# Patient Record
Sex: MALE | Race: WHITE | Employment: OTHER | URBAN - METROPOLITAN AREA
[De-identification: names, ages, dates, MRNs, and addresses within clinical notes are randomized per-mention and may not be internally consistent; named-entity substitution may affect disease eponyms.]

---

## 2017-01-05 NOTE — PROGRESS NOTES
Patient here with . About 2-3 weeks ago had extensive sinus surgery that went reasonably well he was discharged and is part of his discharge medication his wife gave him a promethazine tablet.   Shortly thereafter he began confusion irritability

## 2017-01-10 RX ORDER — PAROXETINE 30 MG/1
TABLET, FILM COATED ORAL
Qty: 90 TABLET | Refills: 0 | Status: SHIPPED | OUTPATIENT
Start: 2017-01-10 | End: 2017-06-13 | Stop reason: DRUGHIGH

## 2017-01-12 ENCOUNTER — TELEPHONE (OUTPATIENT)
Dept: FAMILY MEDICINE CLINIC | Facility: CLINIC | Age: 82
End: 2017-01-12

## 2017-01-12 ENCOUNTER — OFFICE VISIT (OUTPATIENT)
Dept: FAMILY MEDICINE CLINIC | Facility: CLINIC | Age: 82
End: 2017-01-12

## 2017-01-12 DIAGNOSIS — Z02.9 ENCOUNTER FOR ADMINISTRATIVE EXAMINATIONS: Primary | ICD-10-CM

## 2017-01-12 NOTE — PROGRESS NOTES
Patient presents to walk in clinic with wife with complaint today of sinus issues. Upon reviewing history was seen at his PCP office on 1/5/17 after sinus surgery for possible antibiotic reaction and evaluated for sinus problem.  Note states that recheck on

## 2017-01-13 RX ORDER — FINASTERIDE 5 MG/1
TABLET, FILM COATED ORAL
Qty: 90 TABLET | Refills: 0 | Status: SHIPPED | OUTPATIENT
Start: 2017-01-13 | End: 2017-04-16

## 2017-01-13 NOTE — PROGRESS NOTES
Here yet again shortly after he had another office visit. He is accompanied by his wife. He is concerned about ongoing drainage especially in the right estuardo-facial area. He has had several operations for his sinuses some very recently.   Though he feels

## 2017-01-16 ENCOUNTER — MED REC SCAN ONLY (OUTPATIENT)
Dept: FAMILY MEDICINE CLINIC | Facility: CLINIC | Age: 82
End: 2017-01-16

## 2017-01-16 ENCOUNTER — TELEPHONE (OUTPATIENT)
Dept: FAMILY MEDICINE CLINIC | Facility: CLINIC | Age: 82
End: 2017-01-16

## 2017-01-17 ENCOUNTER — TELEPHONE (OUTPATIENT)
Dept: FAMILY MEDICINE CLINIC | Facility: CLINIC | Age: 82
End: 2017-01-17

## 2017-01-18 ENCOUNTER — TELEPHONE (OUTPATIENT)
Dept: FAMILY MEDICINE CLINIC | Facility: CLINIC | Age: 82
End: 2017-01-18

## 2017-02-01 RX ORDER — TAMSULOSIN HYDROCHLORIDE 0.4 MG/1
CAPSULE ORAL
Qty: 90 CAPSULE | Refills: 0 | Status: SHIPPED | OUTPATIENT
Start: 2017-02-01 | End: 2017-09-05

## 2017-02-03 NOTE — PROGRESS NOTES
Here with his wife. His last meeting was met with a great deal of anxiety and I placed him on a lower dose of Klonopin. He seems a bit calmer both to himself and his wife but perhaps has become a bit sleepy.   He still on Namenda and Aricept his wife feel

## 2017-02-16 ENCOUNTER — OFFICE VISIT (OUTPATIENT)
Dept: FAMILY MEDICINE CLINIC | Facility: CLINIC | Age: 82
End: 2017-02-16

## 2017-02-16 VITALS
TEMPERATURE: 99 F | OXYGEN SATURATION: 98 % | HEART RATE: 75 BPM | RESPIRATION RATE: 20 BRPM | DIASTOLIC BLOOD PRESSURE: 80 MMHG | BODY MASS INDEX: 24 KG/M2 | WEIGHT: 151 LBS | SYSTOLIC BLOOD PRESSURE: 130 MMHG

## 2017-02-16 DIAGNOSIS — J30.0 NONALLERGIC VASOMOTOR RHINITIS: Primary | ICD-10-CM

## 2017-02-16 PROCEDURE — 99213 OFFICE O/P EST LOW 20 MIN: CPT | Performed by: NURSE PRACTITIONER

## 2017-02-16 NOTE — PATIENT INSTRUCTIONS
Follow up with ENT as scheduled  Saline nasal spray or rinse  Can use Afrin for 2-3 days  Humidifier in room    Understanding Your Sinuses  Your sinuses are air-filled spaces between the bones in your head.  They have small openings that connect to the na

## 2017-02-16 NOTE — PROGRESS NOTES
CHIEF COMPLAINT:   Patient presents with:  Nasal Congestion: runny nose and nasal congetion on right side  x 2 wks      HPI:   Dioni Mendez is a 80year old male who presents for upper respiratory symptoms for  2 weeks.  Patient reports \"dripping nose\" o Diagnosis Date   • Acute bronchitis    • Personal history of other diseases of circulatory system    • Acute sinusitis, unspecified    • Allergic rhinitis, cause unspecified    • Contact dermatitis and other eczema, due to unspecified cause    • Sciatica LUNGS: denies shortness of breath or wheezing, See HPI  CARDIOVASCULAR: denies chest pain or palpitations   GI: denies N/V/C or abdominal pain  NEURO: Denies headaches    EXAM:   /80 mmHg  Pulse 75  Temp(Src) 98.7 °F (37.1 °C) (Oral)  Resp 20  Wt 151 Your sinuses are air-filled spaces between the bones in your head. They have small openings that connect to the nasal cavity. The sinuses make mucus that drains into the nose.  This helps keep the nose moist and free of dust and germs.            Parts of t

## 2017-03-16 PROBLEM — I25.10 ATHEROSCLEROSIS OF NATIVE CORONARY ARTERY OF NATIVE HEART WITHOUT ANGINA PECTORIS: Status: ACTIVE | Noted: 2017-03-16

## 2017-03-22 ENCOUNTER — NURSE ONLY (OUTPATIENT)
Dept: FAMILY MEDICINE CLINIC | Facility: CLINIC | Age: 82
End: 2017-03-22

## 2017-03-22 VITALS
WEIGHT: 148 LBS | TEMPERATURE: 98 F | HEIGHT: 68 IN | DIASTOLIC BLOOD PRESSURE: 78 MMHG | OXYGEN SATURATION: 99 % | SYSTOLIC BLOOD PRESSURE: 124 MMHG | BODY MASS INDEX: 22.43 KG/M2 | HEART RATE: 67 BPM | RESPIRATION RATE: 20 BRPM

## 2017-03-22 DIAGNOSIS — J00 ACUTE NASOPHARYNGITIS: Primary | ICD-10-CM

## 2017-03-22 PROCEDURE — 99213 OFFICE O/P EST LOW 20 MIN: CPT | Performed by: NURSE PRACTITIONER

## 2017-03-22 NOTE — PATIENT INSTRUCTIONS
-Cool Humidified air in room  -Sleep with head elevated at nightime if coughing    -Push fluids and plenty of rest    -OTC Tylenol/Ibuprofen as packet insert   -Soothing cough drops as packet insert   -Flonase OTC 2 sprays each nostril daily as packet inse

## 2017-03-22 NOTE — PROGRESS NOTES
CHIEF COMPLAINT:   Patient presents with: Body ache and/or chills: s/s for 4-5 days. HPI:   Clearance Lis is a 80year old male who presents for upper respiratory symptoms for  4-5 days. Patient reports chills, body aches, post nasal drip, cough. High blood pressure    • High cholesterol    • Coronary atherosclerosis      bypass surg x4 with 1 stent placed   • Atherosclerosis of coronary artery    • Hyperlipidemia    • Essential hypertension    • Heart disease    • Dementia           Past Surgical nourished,in no apparent distress  SKIN: no rashes,no suspicious lesions  HEAD: atraumatic, normocephalic.  no tenderness on palpation of sinuses  EYES: conjunctiva clear, EOM intact  EARS: TM's clear, no bulging, no retraction, no fluid, bony landmarks vi

## 2017-03-24 ENCOUNTER — APPOINTMENT (OUTPATIENT)
Dept: LAB | Age: 82
End: 2017-03-24
Attending: FAMILY MEDICINE
Payer: MEDICARE

## 2017-03-24 ENCOUNTER — CHARTING TRANS (OUTPATIENT)
Dept: OTHER | Age: 82
End: 2017-03-24

## 2017-03-24 DIAGNOSIS — N13.8 BPH WITH URINARY OBSTRUCTION: ICD-10-CM

## 2017-03-24 DIAGNOSIS — N40.1 BPH WITH URINARY OBSTRUCTION: ICD-10-CM

## 2017-03-24 LAB — PSA SERPL-MCNC: 3.93 NG/ML (ref 0.01–4)

## 2017-03-24 PROCEDURE — 36415 COLL VENOUS BLD VENIPUNCTURE: CPT

## 2017-03-24 PROCEDURE — 84153 ASSAY OF PSA TOTAL: CPT

## 2017-04-08 ENCOUNTER — APPOINTMENT (OUTPATIENT)
Dept: CT IMAGING | Age: 82
End: 2017-04-08
Attending: EMERGENCY MEDICINE
Payer: MEDICARE

## 2017-04-08 ENCOUNTER — APPOINTMENT (OUTPATIENT)
Dept: ULTRASOUND IMAGING | Age: 82
End: 2017-04-08
Attending: EMERGENCY MEDICINE
Payer: MEDICARE

## 2017-04-08 ENCOUNTER — HOSPITAL ENCOUNTER (EMERGENCY)
Age: 82
Discharge: HOME | End: 2017-04-08
Attending: EMERGENCY MEDICINE
Payer: MEDICARE

## 2017-04-08 ENCOUNTER — OFFICE VISIT (OUTPATIENT)
Dept: FAMILY MEDICINE CLINIC | Facility: CLINIC | Age: 82
End: 2017-04-08

## 2017-04-08 VITALS
DIASTOLIC BLOOD PRESSURE: 78 MMHG | BODY MASS INDEX: 22.43 KG/M2 | HEIGHT: 68 IN | SYSTOLIC BLOOD PRESSURE: 130 MMHG | HEART RATE: 74 BPM | TEMPERATURE: 97 F | RESPIRATION RATE: 16 BRPM | WEIGHT: 148 LBS | OXYGEN SATURATION: 99 %

## 2017-04-08 VITALS
OXYGEN SATURATION: 100 % | HEIGHT: 68 IN | DIASTOLIC BLOOD PRESSURE: 77 MMHG | BODY MASS INDEX: 22.73 KG/M2 | SYSTOLIC BLOOD PRESSURE: 155 MMHG | HEART RATE: 61 BPM | RESPIRATION RATE: 16 BRPM | WEIGHT: 150 LBS | TEMPERATURE: 98 F

## 2017-04-08 DIAGNOSIS — Z02.9 ENCOUNTERS FOR ADMINISTRATIVE PURPOSE: Primary | ICD-10-CM

## 2017-04-08 DIAGNOSIS — R10.13 ABDOMINAL PAIN, EPIGASTRIC: Primary | ICD-10-CM

## 2017-04-08 PROCEDURE — 99285 EMERGENCY DEPT VISIT HI MDM: CPT

## 2017-04-08 PROCEDURE — 87086 URINE CULTURE/COLONY COUNT: CPT | Performed by: EMERGENCY MEDICINE

## 2017-04-08 PROCEDURE — 76700 US EXAM ABDOM COMPLETE: CPT

## 2017-04-08 PROCEDURE — 74177 CT ABD & PELVIS W/CONTRAST: CPT

## 2017-04-08 PROCEDURE — 85025 COMPLETE CBC W/AUTO DIFF WBC: CPT | Performed by: EMERGENCY MEDICINE

## 2017-04-08 PROCEDURE — 93005 ELECTROCARDIOGRAM TRACING: CPT

## 2017-04-08 PROCEDURE — 93010 ELECTROCARDIOGRAM REPORT: CPT

## 2017-04-08 PROCEDURE — 84484 ASSAY OF TROPONIN QUANT: CPT | Performed by: EMERGENCY MEDICINE

## 2017-04-08 PROCEDURE — 80053 COMPREHEN METABOLIC PANEL: CPT | Performed by: EMERGENCY MEDICINE

## 2017-04-08 PROCEDURE — 83690 ASSAY OF LIPASE: CPT | Performed by: EMERGENCY MEDICINE

## 2017-04-08 PROCEDURE — 36415 COLL VENOUS BLD VENIPUNCTURE: CPT

## 2017-04-08 PROCEDURE — 81001 URINALYSIS AUTO W/SCOPE: CPT | Performed by: EMERGENCY MEDICINE

## 2017-04-08 NOTE — PROGRESS NOTES
Patient presents with nausea, epigastric abdominal pain, and lethargy/achy x5 days. Denies chest pain, SOB, fever, dizziness/lightheadedness/headache or D/V/C. Called immediate care in [place] [phone] advised ER for more extensive cardiac work up.

## 2017-04-08 NOTE — ED INITIAL ASSESSMENT (HPI)
Mid abd pain for 5 days, feels like stomach flu, nausea no vomiting or diarrhea, is able to eat, denies chest pain or shortness of breath

## 2017-04-08 NOTE — ED PROVIDER NOTES
Patient Seen in: THE [facility] Emergency Department In [place]    History   Patient presents with:  Abdomen/Flank Pain (GI/)    Stated Complaint: nausea, abd pain, sent from [facility]    HPI    30-year-old male who presents here to the emergency department compl Comment Procedure: SEPTORHINOPLASTY WITH OSTEOTOMIES AND  GRAFTS;  Surgeon: Ambrocio Looney;   Location: [facility]    PATIENT DOCUMENTED NOT TO HAVE EXPERIENCED ANY OF THE FOLLOWING EVENTS N/A 3/15/2016    Comment Procedure: SEPTORHINOPLASTY WIT signs reviewed. All other systems reviewed and negative except as noted above. PSFH elements reviewed from today and agreed except as otherwise stated in HPI.     Physical Exam       ED Triage Vitals   BP 04/08/17 1219 132/78 mmHg   Pulse 04/08/17 1 for the following:     WBC Urine 5-10 (*)     RBC URINE 3-5 (*)     Bacteria Urine Rare (*)     Mucous Urine 2+ (*)     All other components within normal limits   COMP METABOLIC PANEL (14) - Abnormal; Notable for the following:     Glucose 113 (*)     BUN (CPT=76700) (Final result) Result time: 04/08/17 14:45:32     Final result by Milka Green MD (04/08/17 14:45:32)     Impression:     CONCLUSION:    1. Hepatic steatosis.   2. Multiple hepatic cysts and small midpole probable parapelvic cyst of left kidney Medication List

## 2017-04-10 ENCOUNTER — OFFICE VISIT (OUTPATIENT)
Dept: FAMILY MEDICINE CLINIC | Facility: CLINIC | Age: 82
End: 2017-04-10

## 2017-04-10 VITALS
DIASTOLIC BLOOD PRESSURE: 61 MMHG | HEART RATE: 61 BPM | BODY MASS INDEX: 22.73 KG/M2 | WEIGHT: 150 LBS | OXYGEN SATURATION: 98 % | RESPIRATION RATE: 20 BRPM | TEMPERATURE: 98 F | SYSTOLIC BLOOD PRESSURE: 112 MMHG | HEIGHT: 68 IN

## 2017-04-10 DIAGNOSIS — R10.0 ACUTE ABDOMINAL PAIN SYNDROME: Primary | ICD-10-CM

## 2017-04-10 PROCEDURE — 99213 OFFICE O/P EST LOW 20 MIN: CPT | Performed by: FAMILY MEDICINE

## 2017-04-10 NOTE — PROGRESS NOTES
Patient is here with wife he was seen in the emergency room this past weekend for midepigastric and supraumbilical pain the pain was midline and it did not change whatsoever.   An ultrasound showed hepatic cysts no other abnormalities perhaps an uncomplicat

## 2017-04-17 ENCOUNTER — TELEPHONE (OUTPATIENT)
Dept: FAMILY MEDICINE CLINIC | Facility: CLINIC | Age: 82
End: 2017-04-17

## 2017-04-17 RX ORDER — FINASTERIDE 5 MG/1
TABLET, FILM COATED ORAL
Qty: 90 TABLET | Refills: 0 | Status: SHIPPED | OUTPATIENT
Start: 2017-04-17 | End: 2017-08-16

## 2017-04-17 NOTE — TELEPHONE ENCOUNTER
Pt wife state pt abdominal pain in not any better, pt now has some nausea. No vomiting or diarrhea. Pain about his belly button. Do you want to do do any additional imaging?   Send to GI?

## 2017-04-19 ENCOUNTER — OFFICE VISIT (OUTPATIENT)
Dept: FAMILY MEDICINE CLINIC | Facility: CLINIC | Age: 82
End: 2017-04-19

## 2017-04-19 ENCOUNTER — HOSPITAL ENCOUNTER (EMERGENCY)
Age: 82
Discharge: HOME | End: 2017-04-19
Attending: EMERGENCY MEDICINE
Payer: MEDICARE

## 2017-04-19 VITALS
HEART RATE: 65 BPM | RESPIRATION RATE: 16 BRPM | OXYGEN SATURATION: 98 % | HEIGHT: 68 IN | BODY MASS INDEX: 22.73 KG/M2 | DIASTOLIC BLOOD PRESSURE: 88 MMHG | WEIGHT: 150 LBS | SYSTOLIC BLOOD PRESSURE: 156 MMHG | TEMPERATURE: 98 F

## 2017-04-19 VITALS
TEMPERATURE: 98 F | OXYGEN SATURATION: 98 % | DIASTOLIC BLOOD PRESSURE: 80 MMHG | BODY MASS INDEX: 22.96 KG/M2 | RESPIRATION RATE: 20 BRPM | HEIGHT: 67.25 IN | SYSTOLIC BLOOD PRESSURE: 134 MMHG | HEART RATE: 69 BPM | WEIGHT: 148 LBS

## 2017-04-19 DIAGNOSIS — Z02.9 ENCOUNTERS FOR ADMINISTRATIVE PURPOSE: Primary | ICD-10-CM

## 2017-04-19 DIAGNOSIS — T17.1XXA NASAL FOREIGN BODY, INITIAL ENCOUNTER: Primary | ICD-10-CM

## 2017-04-19 PROCEDURE — 99282 EMERGENCY DEPT VISIT SF MDM: CPT

## 2017-04-19 PROCEDURE — 99283 EMERGENCY DEPT VISIT LOW MDM: CPT

## 2017-04-19 PROCEDURE — 30300 REMOVE NASAL FOREIGN BODY: CPT

## 2017-04-19 NOTE — ED PROVIDER NOTES
Patient Seen in: [facility] Emergency Department In [place]    History   Patient presents with:  FB in Nose (nasopharyngeal)    Stated Complaint: FB in nose    HPI    61-year-old male presents to the emergency department for foreign body of right nostril. GALLBLADDER         Medications :   FINASTERIDE 5 MG Oral Tab,  TAKE 1 TABLET BY MOUTH EVERY DAY   Donepezil HCl 10 MG Oral Tab,     triamcinolone acetonide 0.1 % External Cream,  Disp as an ointment   Not a creme   TAMSULOSIN HCL 0.4 MG Oral Cap,  TAKE 1 04/19/17 1348 98 %   O2 Device 04/19/17 1348 None (Room air)       Current:/88 mmHg  Pulse 65  Temp(Src) 97.8 °F (36.6 °C) (Temporal)  Resp 16  Ht 172.7 cm (5' 8\")  Wt 68.04 kg  BMI 22.81 kg/m2  SpO2 98%        Physical Exam   Constitutional: He dustin

## 2017-04-19 NOTE — ED NOTES
Up ambulatory, no bleeding noted. Pt states  He can breath well through right nostril. Discharge to home.

## 2017-04-19 NOTE — PROGRESS NOTES
Pt presented with c/o having a sinus cone lodged in rt nare. Pt has sinus surgery 6 weeks ago. Seen by ENT yesterday and given Max-Air sinus cones to try. Pt placed one in rt nare today, he later was sniffling and felt the cone move up in his nare.   Tri

## 2017-04-20 ENCOUNTER — TELEPHONE (OUTPATIENT)
Dept: FAMILY MEDICINE CLINIC | Facility: CLINIC | Age: 82
End: 2017-04-20

## 2017-04-21 ENCOUNTER — OFFICE VISIT (OUTPATIENT)
Dept: FAMILY MEDICINE CLINIC | Facility: CLINIC | Age: 82
End: 2017-04-21

## 2017-04-21 VITALS
RESPIRATION RATE: 16 BRPM | HEIGHT: 67.25 IN | DIASTOLIC BLOOD PRESSURE: 72 MMHG | SYSTOLIC BLOOD PRESSURE: 128 MMHG | BODY MASS INDEX: 23.27 KG/M2 | TEMPERATURE: 98 F | HEART RATE: 60 BPM | WEIGHT: 150 LBS

## 2017-04-21 DIAGNOSIS — J30.0 ACUTE VASOMOTOR RHINITIS: Primary | ICD-10-CM

## 2017-04-21 PROCEDURE — 99213 OFFICE O/P EST LOW 20 MIN: CPT | Performed by: FAMILY MEDICINE

## 2017-04-21 RX ORDER — CLONAZEPAM 0.5 MG/1
0.5 TABLET ORAL NIGHTLY PRN
Qty: 40 TABLET | Refills: 1 | Status: SHIPPED | COMMUNITY
Start: 2017-04-21 | End: 2017-07-20 | Stop reason: ALTCHOICE

## 2017-04-21 RX ORDER — CLONAZEPAM 0.5 MG/1
0.5 TABLET ORAL 2 TIMES DAILY PRN
Qty: 40 TABLET | Refills: 0 | Status: SHIPPED | OUTPATIENT
Start: 2017-04-21 | End: 2017-05-26

## 2017-04-21 NOTE — PROGRESS NOTES
Yesterday seen in the ER for foreign body removal of his right naris. He has no other complaints other than his chronic complaints related to vasomotor rhinitis. Requesting ENT consult which was given to Dr. Suzy Norman.     We briefly talked about his

## 2017-04-25 ENCOUNTER — TELEPHONE (OUTPATIENT)
Dept: FAMILY MEDICINE CLINIC | Facility: CLINIC | Age: 82
End: 2017-04-25

## 2017-04-25 NOTE — TELEPHONE ENCOUNTER
JENNIE  ON FILE    WANTS CLAUDIA TO RX TRAZADONE 50MG FOR PATIENT. WIFE SAID HE RX'D IT FOR HER, AND SHE DIDN'T TAKE IT BUT GAVE IT TO  FOR SLEEP AND IT WORKS. SHE WANTS CLAUDIA TO RX THIS FOR . WILL HE DO THIS?

## 2017-04-25 NOTE — TELEPHONE ENCOUNTER
Pt's wife was given trazadone 3/6/17 #30,  and she gave it to her  to take. States it worked well to help him sleep. Requesting RX for this med. Approve/deny?

## 2017-04-26 PROCEDURE — 87086 URINE CULTURE/COLONY COUNT: CPT | Performed by: UROLOGY

## 2017-05-02 PROCEDURE — 88344 IMHCHEM/IMCYTCHM EA MLT ANTB: CPT | Performed by: UROLOGY

## 2017-05-02 PROCEDURE — 88305 TISSUE EXAM BY PATHOLOGIST: CPT | Performed by: UROLOGY

## 2017-05-26 ENCOUNTER — HOSPITAL ENCOUNTER (OUTPATIENT)
Dept: RADIATION ONCOLOGY | Age: 82
Discharge: HOME | End: 2017-05-26
Attending: RADIOLOGY
Payer: MEDICARE

## 2017-05-26 VITALS
BODY MASS INDEX: 23.97 KG/M2 | DIASTOLIC BLOOD PRESSURE: 92 MMHG | RESPIRATION RATE: 20 BRPM | WEIGHT: 152.69 LBS | TEMPERATURE: 97 F | OXYGEN SATURATION: 98 % | HEIGHT: 67 IN | SYSTOLIC BLOOD PRESSURE: 184 MMHG | HEART RATE: 72 BPM

## 2017-05-26 DIAGNOSIS — C61 PROSTATE CANCER (HCC): Primary | ICD-10-CM

## 2017-05-26 PROCEDURE — 99214 OFFICE O/P EST MOD 30 MIN: CPT

## 2017-05-26 RX ORDER — IPRATROPIUM BROMIDE 21 UG/1
2 SPRAY, METERED NASAL DAILY
Refills: 0 | COMMUNITY
Start: 2017-05-01 | End: 2018-01-11

## 2017-05-26 NOTE — CONSULTS
[facility]    PATIENT'S NAME: DIA, [de-identified] A   RADIATION ONCOLOGIST: Sammy Ford M.D.    PATIENT ACCOUNT #: [de-identified] [facility]   MEDICAL RECORD #: [id number] YOB: 1935   CONSULTATION DATE: 05/26/2017       RADIAT with atherosclerosis, hyperlipidemia, dementia (on Namenda). PAST SURGICAL HISTORY:  Cholecystectomy in 2000, CABG in 2000, prostate biopsy as above, tonsillectomy, nasal surgery, removal of cartilage for the nasal graft, cholecystectomy.     MEDICATIONS + 4.  The PSA of 3.93 is in the low-risk category. In the intermediate category, he has the option of external beam radiation therapy or radical surgery. I recommend external beam radiation therapy.   We also discussed brachytherapy, androgen deprivation Brian Richards M.D.  d: 05/26/2017 16:29:48  t: 05/26/2017 17:16:46  [facility] [id number]  /

## 2017-05-26 NOTE — PROGRESS NOTES
Nursing Consultation Note  Patient: Celia Brunson  YOB: 1935  Age: 80year old  Radiation Oncologist: Dr. Matteo Chiu  Referring Physician: Arletta Hoover, Dr. Rebeca Cheadle  Diagnosis: 97 Elenita Avilau Said Date: 5/26/2017    Histor Surgeon: Huber Dejesus; Location: [facility]    REMV CARTILAGE FOR GRAFT NASAL N/A 3/15/2016    Comment Procedure: SEPTORHINOPLASTY WITH OSTEOTOMIES AND  GRAFTS;  Surgeon: Huber Dejesus;   Location: [facility]    PATIENT WITH PREOPERATIVE ORDER Disp: 90 tablet Rfl: 0   Rosuvastatin Calcium 5 MG Oral Tab TAKE 1 TABLET BY MOUTH NIGHTLY Disp: 90 tablet Rfl: 1   LISINOPRIL 2.5 MG Oral Tab TAKE 1 TABLET(2.5 MG) BY MOUTH EVERY DAY Disp: 90 tablet Rfl: 1   Memantine HCl 10 MG Oral Tab Take 1 tablet (10

## 2017-06-01 ENCOUNTER — HOSPITAL ENCOUNTER (OUTPATIENT)
Dept: RADIATION ONCOLOGY | Age: 82
End: 2017-06-01
Attending: RADIOLOGY
Payer: MEDICARE

## 2017-06-06 ENCOUNTER — HOSPITAL ENCOUNTER (OUTPATIENT)
Dept: RADIATION ONCOLOGY | Age: 82
Discharge: HOME | End: 2017-06-06
Attending: RADIOLOGY
Payer: MEDICARE

## 2017-06-06 PROCEDURE — 77334 RADIATION TREATMENT AID(S): CPT | Performed by: RADIOLOGY

## 2017-06-06 PROCEDURE — 77399 UNLISTED PX MED RADJ PHYSICS: CPT | Performed by: RADIOLOGY

## 2017-06-13 ENCOUNTER — TELEPHONE (OUTPATIENT)
Dept: FAMILY MEDICINE CLINIC | Facility: CLINIC | Age: 82
End: 2017-06-13

## 2017-06-13 RX ORDER — PAROXETINE HYDROCHLORIDE 40 MG/1
40 TABLET, FILM COATED ORAL EVERY MORNING
Qty: 30 TABLET | Refills: 0 | Status: SHIPPED | OUTPATIENT
Start: 2017-06-13 | End: 2017-09-11

## 2017-06-13 RX ORDER — PAROXETINE HYDROCHLORIDE 40 MG/1
TABLET, FILM COATED ORAL
Qty: 90 TABLET | Refills: 0 | OUTPATIENT
Start: 2017-06-13

## 2017-06-13 NOTE — PROGRESS NOTES
Presents with daughter out of concern for this gentleman's mood and mentation.   Over the past several weeks of very improbable story has developed his wife had a very significant encounter with respiratory failure and required nearly 2 weeks worth of venti

## 2017-06-14 PROCEDURE — 77300 RADIATION THERAPY DOSE PLAN: CPT | Performed by: RADIOLOGY

## 2017-06-14 PROCEDURE — 77338 DESIGN MLC DEVICE FOR IMRT: CPT | Performed by: RADIOLOGY

## 2017-06-14 PROCEDURE — 77301 RADIOTHERAPY DOSE PLAN IMRT: CPT | Performed by: RADIOLOGY

## 2017-06-19 ENCOUNTER — MEDICAL CORRESPONDENCE (OUTPATIENT)
Dept: RADIATION ONCOLOGY | Age: 82
End: 2017-06-19

## 2017-06-19 RX ORDER — ROSUVASTATIN CALCIUM 5 MG/1
TABLET, COATED ORAL
Qty: 90 TABLET | Refills: 0 | Status: SHIPPED | OUTPATIENT
Start: 2017-06-19 | End: 2017-12-18

## 2017-06-19 NOTE — PROGRESS NOTES
Dr. Hopper Spearing note appreciated. 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   They will let

## 2017-06-30 ENCOUNTER — TELEPHONE (OUTPATIENT)
Dept: RADIATION ONCOLOGY | Age: 82
End: 2017-06-30

## 2017-06-30 NOTE — TELEPHONE ENCOUNTER
Called and spoke to pt's daughter Shannon Deng regarding pt status, states pt is admitted at [facility] for dementia.  States family still struggling with both parents admitted (mom in rehab); was told by PCP to hold off on RT until pt discharged or unti

## 2017-07-01 ENCOUNTER — HOSPITAL ENCOUNTER (OUTPATIENT)
Dept: RADIATION ONCOLOGY | Age: 82
End: 2017-07-01
Attending: RADIOLOGY
Payer: MEDICARE

## 2017-07-11 ENCOUNTER — TELEPHONE (OUTPATIENT)
Dept: FAMILY MEDICINE CLINIC | Facility: CLINIC | Age: 82
End: 2017-07-11

## 2017-07-11 ENCOUNTER — SOCIAL WORK SERVICES (OUTPATIENT)
Dept: HEMATOLOGY/ONCOLOGY | Facility: HOSPITAL | Age: 82
End: 2017-07-11

## 2017-07-11 NOTE — TELEPHONE ENCOUNTER
[employer] phoned in, they will send pt's current Med list and need orders for JG to review and approve.

## 2017-07-11 NOTE — TELEPHONE ENCOUNTER
Form completed, signed per [employer] [address] and faxed to [phone] with confirmation received. Task completed.

## 2017-07-11 NOTE — TELEPHONE ENCOUNTER
[facility] sent over a form that they state needs to be filled out and faxed back to them today. Patient is moving into the assisted living today.         Form in Triage

## 2017-07-11 NOTE — PROGRESS NOTES
SW received call from patient's son, Philip Hanley. Son inquiring about daily transportation for radiation treatment. Son says patient lives at [facility]. Son states that patient has dementia but is ambulatory.    Patient's wife has been in rehab and has i

## 2017-07-11 NOTE — TELEPHONE ENCOUNTER
Obtained fax, [phone] [employer] reviewed and approved, informed Lesia MOREJON of this approval. Task completed.

## 2017-07-12 ENCOUNTER — TELEPHONE (OUTPATIENT)
Dept: FAMILY MEDICINE CLINIC | Facility: CLINIC | Age: 82
End: 2017-07-12

## 2017-07-13 ENCOUNTER — TELEPHONE (OUTPATIENT)
Dept: FAMILY MEDICINE CLINIC | Facility: CLINIC | Age: 82
End: 2017-07-13

## 2017-07-13 ENCOUNTER — APPOINTMENT (OUTPATIENT)
Dept: RADIATION ONCOLOGY | Age: 82
End: 2017-07-13
Attending: RADIOLOGY
Payer: MEDICARE

## 2017-07-13 ENCOUNTER — TELEPHONE (OUTPATIENT)
Dept: RADIATION ONCOLOGY | Age: 82
End: 2017-07-13

## 2017-07-13 NOTE — TELEPHONE ENCOUNTER
Son in law calling, they would like to make sure that Dr Pearson Burkitt back. Noreen Welch is very irate and they would like something to calm him down.          Please call daughter Thien Tim at [phone]

## 2017-07-13 NOTE — TELEPHONE ENCOUNTER
Received call from pt's daughter, cancelled pt's appointment with Dr. Ginny Simmonds for tomorrow. States family needs to work on pt's transportation situation, will call to re-schedule. Dr. Ginny Simmonds and RTT Isa Barrientos) made aware.

## 2017-07-13 NOTE — TELEPHONE ENCOUNTER
Daughter Aly Kruger calling about patient. he is anxious about his new home and daughter asking for something to calm him down. Call at [phone]    Please advise if you would like to prescribe something, pt has an appt 8/20.

## 2017-07-13 NOTE — TELEPHONE ENCOUNTER
Daughter called and would like a nurse to call back, states that father is having problems with his nerves. Daughter states that she does not feel he needs an appointment, wants to see if a medication can be called in.

## 2017-07-14 ENCOUNTER — TELEPHONE (OUTPATIENT)
Dept: FAMILY MEDICINE CLINIC | Facility: CLINIC | Age: 82
End: 2017-07-14

## 2017-07-14 ENCOUNTER — APPOINTMENT (OUTPATIENT)
Dept: RADIATION ONCOLOGY | Age: 82
End: 2017-07-14
Attending: RADIOLOGY
Payer: MEDICARE

## 2017-07-20 ENCOUNTER — OFFICE VISIT (OUTPATIENT)
Dept: FAMILY MEDICINE CLINIC | Facility: CLINIC | Age: 82
End: 2017-07-20

## 2017-07-20 VITALS
BODY MASS INDEX: 22.43 KG/M2 | WEIGHT: 148 LBS | SYSTOLIC BLOOD PRESSURE: 124 MMHG | HEIGHT: 68 IN | RESPIRATION RATE: 18 BRPM | DIASTOLIC BLOOD PRESSURE: 70 MMHG | TEMPERATURE: 98 F | HEART RATE: 68 BPM | OXYGEN SATURATION: 98 %

## 2017-07-20 DIAGNOSIS — R41.0 DELIRIUM, ACUTE: Primary | ICD-10-CM

## 2017-07-20 PROCEDURE — 99213 OFFICE O/P EST LOW 20 MIN: CPT | Performed by: FAMILY MEDICINE

## 2017-07-20 RX ORDER — CLONAZEPAM 0.5 MG/1
0.5 TABLET, ORALLY DISINTEGRATING ORAL 2 TIMES DAILY PRN
Qty: 60 TABLET | Refills: 3 | Status: SHIPPED | OUTPATIENT
Start: 2017-07-20 | End: 2017-12-04

## 2017-07-20 NOTE — PROGRESS NOTES
A complicated situation. This gentleman has been slowly moving into cognitive impairment and Alzheimer's dementia. This has caused him no small amount of anxiety.   Approximately 6 weeks ago his wife who suffers from COPD entered into full respiratory arr

## 2017-08-04 ENCOUNTER — HOSPITAL ENCOUNTER (OUTPATIENT)
Dept: GENERAL RADIOLOGY | Age: 82
Discharge: HOME | End: 2017-08-04
Attending: FAMILY MEDICINE
Payer: MEDICARE

## 2017-08-04 ENCOUNTER — OFFICE VISIT (OUTPATIENT)
Dept: FAMILY MEDICINE CLINIC | Facility: CLINIC | Age: 82
End: 2017-08-04

## 2017-08-04 VITALS
HEART RATE: 89 BPM | OXYGEN SATURATION: 98 % | DIASTOLIC BLOOD PRESSURE: 70 MMHG | BODY MASS INDEX: 23 KG/M2 | SYSTOLIC BLOOD PRESSURE: 130 MMHG | RESPIRATION RATE: 18 BRPM | TEMPERATURE: 98 F | WEIGHT: 148 LBS

## 2017-08-04 DIAGNOSIS — R10.9 ABDOMINAL PAIN, UNSPECIFIED LOCATION: ICD-10-CM

## 2017-08-04 DIAGNOSIS — R10.84 ABDOMINAL PAIN, ACUTE, GENERALIZED: Primary | ICD-10-CM

## 2017-08-04 PROCEDURE — 74000 XR ABDOMEN (1 VIEW) (CPT=74000): CPT | Performed by: FAMILY MEDICINE

## 2017-08-04 PROCEDURE — 99213 OFFICE O/P EST LOW 20 MIN: CPT | Performed by: FAMILY MEDICINE

## 2017-08-04 NOTE — PROGRESS NOTES
Here with wife and caregiver just released from 3 day hospital stay at the [facility] in [facility] for abdominal pain. Apparently constipation was the final diagnosis.   Next    Ever Root continues to deteriorate as far as cognitive functio

## 2017-08-07 ENCOUNTER — TELEPHONE (OUTPATIENT)
Dept: FAMILY MEDICINE CLINIC | Facility: CLINIC | Age: 82
End: 2017-08-07

## 2017-08-07 NOTE — TELEPHONE ENCOUNTER
Pt wife calling to see which oncologist Pritesh recommends for husbands prostate cancer.  Please advise

## 2017-08-07 NOTE — TELEPHONE ENCOUNTER
Per JAVIER:  Dr. Larson [facility] oncology   [facility] [address], [place], [place]   [phone]    Pt's wife notified.

## 2017-08-12 ENCOUNTER — OFFICE VISIT (OUTPATIENT)
Dept: FAMILY MEDICINE CLINIC | Facility: CLINIC | Age: 82
End: 2017-08-12

## 2017-08-12 VITALS
OXYGEN SATURATION: 99 % | BODY MASS INDEX: 21.98 KG/M2 | RESPIRATION RATE: 16 BRPM | WEIGHT: 145 LBS | HEART RATE: 75 BPM | TEMPERATURE: 98 F | DIASTOLIC BLOOD PRESSURE: 58 MMHG | HEIGHT: 68 IN | SYSTOLIC BLOOD PRESSURE: 100 MMHG

## 2017-08-12 DIAGNOSIS — R10.84 GENERALIZED ABDOMINAL PAIN: Primary | ICD-10-CM

## 2017-08-12 DIAGNOSIS — R19.7 DIARRHEA, UNSPECIFIED TYPE: ICD-10-CM

## 2017-08-12 PROCEDURE — 99213 OFFICE O/P EST LOW 20 MIN: CPT | Performed by: NURSE PRACTITIONER

## 2017-08-12 NOTE — PROGRESS NOTES
CHIEF COMPLAINT:   No chief complaint on file. HPI:   Nancy Anderson is a 80year old male who presents with wife for complaints of abdominal pain. Symptoms have been present for 2 weeks.   Pt was hospitalized at [facility] for abdominal pain Diagnosis Date   • Acute bronchitis    • Acute sinusitis, unspecified    • Allergic rhinitis, cause unspecified    • Atherosclerosis of coronary artery    • Contact dermatitis and other eczema, due to unspecified cause    • Coronary atherosclerosis     b murmur  GI: No masses. BS's present x4. No palpable masses or hepatosplenomegaly.   Abdomen is soft, non-distended, mild tenderness upon palpation in periumbilical region  EXTREMITIES: no cyanosis, clubbing or edema  PSYCH: pleasant mood and affect    ASSE

## 2017-08-12 NOTE — PATIENT INSTRUCTIONS
Abdominal Pain    Abdominal pain is pain in the stomach or belly area. Everyone has this pain from time to time. In many cases it goes away on its own. But abdominal pain can sometimes be due to a serious problem, such as appendicitis.  So it’s important If you have vomiting or diarrhea, sip water or other clear fluids. When you are ready to eat solid foods again, start with small amounts of easy-to-digest, low-fat foods. These include apple sauce, toast, or crackers.    When to seek medical care  Call [phone]

## 2017-08-15 ENCOUNTER — OFFICE VISIT (OUTPATIENT)
Dept: FAMILY MEDICINE CLINIC | Facility: CLINIC | Age: 82
End: 2017-08-15

## 2017-08-15 VITALS
WEIGHT: 145 LBS | TEMPERATURE: 98 F | SYSTOLIC BLOOD PRESSURE: 104 MMHG | RESPIRATION RATE: 16 BRPM | BODY MASS INDEX: 21.48 KG/M2 | DIASTOLIC BLOOD PRESSURE: 72 MMHG | HEART RATE: 72 BPM | HEIGHT: 69 IN

## 2017-08-15 DIAGNOSIS — R10.9 ABDOMINAL PAIN, UNSPECIFIED LOCATION: Primary | ICD-10-CM

## 2017-08-15 LAB
MULTISTIX LOT#: NORMAL NUMERIC
PH, URINE: 7 (ref 4.5–8)
SPECIFIC GRAVITY: 1.01 (ref 1–1.03)
UROBILINOGEN,SEMI-QN: 0.2 MG/DL (ref 0–1.9)

## 2017-08-15 PROCEDURE — 99213 OFFICE O/P EST LOW 20 MIN: CPT | Performed by: FAMILY MEDICINE

## 2017-08-15 PROCEDURE — 81003 URINALYSIS AUTO W/O SCOPE: CPT | Performed by: FAMILY MEDICINE

## 2017-08-15 NOTE — PROGRESS NOTES
Here with wife and caregiver. Was seen in the emergency room for what was severe diarrhea but most likely caused by constipation. He is finally reached a level of pain relief and reestablishment of regular stooling.   Unfortunately he still taking Pepto-B

## 2017-08-16 RX ORDER — FINASTERIDE 5 MG/1
TABLET, FILM COATED ORAL
Qty: 90 TABLET | Refills: 0 | Status: SHIPPED | OUTPATIENT
Start: 2017-08-16 | End: 2017-12-19

## 2017-08-27 ENCOUNTER — OFFICE VISIT (OUTPATIENT)
Dept: FAMILY MEDICINE CLINIC | Facility: CLINIC | Age: 82
End: 2017-08-27

## 2017-08-27 VITALS
RESPIRATION RATE: 20 BRPM | TEMPERATURE: 99 F | HEART RATE: 63 BPM | DIASTOLIC BLOOD PRESSURE: 70 MMHG | WEIGHT: 145 LBS | OXYGEN SATURATION: 99 % | BODY MASS INDEX: 21.98 KG/M2 | HEIGHT: 68 IN | SYSTOLIC BLOOD PRESSURE: 112 MMHG

## 2017-08-27 DIAGNOSIS — J34.89 IRRITATION OF NOSE: Primary | ICD-10-CM

## 2017-08-27 PROCEDURE — 99212 OFFICE O/P EST SF 10 MIN: CPT | Performed by: NURSE PRACTITIONER

## 2017-08-27 RX ORDER — AMOXICILLIN 500 MG/1
CAPSULE ORAL
Refills: 0 | COMMUNITY
Start: 2017-08-22 | End: 2017-10-11

## 2017-08-27 RX ORDER — VANCOMYCIN HYDROCHLORIDE 125 MG/1
125 CAPSULE ORAL
Refills: 0 | COMMUNITY
Start: 2017-08-22 | End: 2017-12-04

## 2017-08-27 NOTE — PATIENT INSTRUCTIONS
· Please blow nose gently using Saline nasal spray to area several times daily. · Use humidifier in the bedroom for comfort at night. · Use a small amount of vaseline or triple antibiotic to the nose with a q tip nightly.   · Follow up with primary care i

## 2017-08-27 NOTE — PROGRESS NOTES
HPI:    Patient ID: Shyla Jacobo is a [de-identified]year old male. Past history of right sided sinus surgery in 2016. Nasal Congestion   This is a new problem. The current episode started today. The problem occurs constantly. The problem has been unchanged. Normocephalic and atraumatic. Right Ear: External ear normal.   Left Ear: External ear normal.   Nose: Mucosal edema and rhinorrhea present. No nose lacerations. No epistaxis. No foreign bodies.    Mouth/Throat: Oropharynx is clear and moist. No orophary

## 2017-09-05 ENCOUNTER — TELEPHONE (OUTPATIENT)
Dept: FAMILY MEDICINE CLINIC | Facility: CLINIC | Age: 82
End: 2017-09-05

## 2017-09-05 NOTE — TELEPHONE ENCOUNTER
[employer] will be sending forms back today, per pts wife we did not fill it out correctly and it needs to be corrected and sent back, please call wife once this is done.

## 2017-09-06 RX ORDER — TAMSULOSIN HYDROCHLORIDE 0.4 MG/1
CAPSULE ORAL
Qty: 90 CAPSULE | Refills: 0 | Status: SHIPPED | OUTPATIENT
Start: 2017-09-06 | End: 2017-12-19

## 2017-09-07 NOTE — TELEPHONE ENCOUNTER
Paperwork returned.   [id number]

## 2017-09-08 ENCOUNTER — TELEPHONE (OUTPATIENT)
Dept: FAMILY MEDICINE CLINIC | Facility: CLINIC | Age: 82
End: 2017-09-08

## 2017-09-08 NOTE — TELEPHONE ENCOUNTER
Harry Faria from [employer] called regarding the forms they received, forms where initially filled out wrong they were sent back for corrections, forms where corrected and refaxed to [employer], however they can not understand the corrections and need clarificat

## 2017-09-11 RX ORDER — PAROXETINE HYDROCHLORIDE 40 MG/1
40 TABLET, FILM COATED ORAL EVERY MORNING
Qty: 90 TABLET | Refills: 0 | Status: SHIPPED | OUTPATIENT
Start: 2017-09-11 | End: 2017-12-19

## 2017-09-11 NOTE — TELEPHONE ENCOUNTER
Refill paroxetine 40mg. [place] on file. He is out of meds and needs this today. Call if any problems.

## 2017-09-11 NOTE — TELEPHONE ENCOUNTER
rx was last refilled on 6/13/17 #30, has pt been off of this?    Attempted to call pt to clarify- busy signal x 2 , retry later

## 2017-09-11 NOTE — TELEPHONE ENCOUNTER
Wife states pt was in a memory unit for some time and a Dr. Morris Shoulder refilled this medication for him on 7/20/17. Approve/deny?  Last ov 8/15/17

## 2017-09-19 ENCOUNTER — OFFICE VISIT (OUTPATIENT)
Dept: FAMILY MEDICINE CLINIC | Facility: CLINIC | Age: 82
End: 2017-09-19

## 2017-09-19 DIAGNOSIS — R10.13 DYSPEPSIA: Primary | ICD-10-CM

## 2017-09-19 NOTE — PROGRESS NOTES
Her for medical questions. Has \"upset stomach\"  Denies pain or constitutional complaints. Her for OTC advice. Pepcid recommended. Instucted to follow with PCM if problem worsens or remain present for 3 days. Understanding verbalized.

## 2017-10-02 ENCOUNTER — OFFICE VISIT (OUTPATIENT)
Dept: FAMILY MEDICINE CLINIC | Facility: CLINIC | Age: 82
End: 2017-10-02

## 2017-10-02 VITALS
DIASTOLIC BLOOD PRESSURE: 66 MMHG | OXYGEN SATURATION: 98 % | TEMPERATURE: 98 F | BODY MASS INDEX: 21 KG/M2 | SYSTOLIC BLOOD PRESSURE: 126 MMHG | WEIGHT: 140 LBS | RESPIRATION RATE: 18 BRPM | HEART RATE: 73 BPM

## 2017-10-02 DIAGNOSIS — A04.72 CLOSTRIDIUM DIFFICILE COLITIS: Primary | ICD-10-CM

## 2017-10-02 PROCEDURE — 99213 OFFICE O/P EST LOW 20 MIN: CPT | Performed by: FAMILY MEDICINE

## 2017-10-02 NOTE — PROGRESS NOTES
Follow up for recent hospitalization for C. difficile enteritis. He is still finishing a 6 week course of vancomycin and tolerating it well with only occasional diarrhea.   The difficulty in his family is he himself is battling slowly progressive dementia

## 2017-10-11 ENCOUNTER — HOSPITAL ENCOUNTER (INPATIENT)
Facility: HOSPITAL | Age: 82
LOS: 2 days | Discharge: HOME HEALTH | DRG: 373 | End: 2017-10-13
Attending: EMERGENCY MEDICINE | Admitting: HOSPITALIST
Payer: MEDICARE

## 2017-10-11 ENCOUNTER — APPOINTMENT (OUTPATIENT)
Dept: CT IMAGING | Facility: HOSPITAL | Age: 82
DRG: 373 | End: 2017-10-11
Attending: HOSPITALIST
Payer: MEDICARE

## 2017-10-11 DIAGNOSIS — E87.6 HYPOKALEMIA: ICD-10-CM

## 2017-10-11 DIAGNOSIS — A04.72 CLOSTRIDIUM DIFFICILE COLITIS: Primary | ICD-10-CM

## 2017-10-11 DIAGNOSIS — R10.9 ABDOMINAL PAIN, ACUTE: ICD-10-CM

## 2017-10-11 PROCEDURE — 99223 1ST HOSP IP/OBS HIGH 75: CPT | Performed by: HOSPITALIST

## 2017-10-11 PROCEDURE — 74177 CT ABD & PELVIS W/CONTRAST: CPT | Performed by: HOSPITALIST

## 2017-10-11 RX ORDER — SODIUM CHLORIDE 9 MG/ML
INJECTION, SOLUTION INTRAVENOUS CONTINUOUS
Status: DISCONTINUED | OUTPATIENT
Start: 2017-10-11 | End: 2017-10-13

## 2017-10-11 RX ORDER — MORPHINE SULFATE 4 MG/ML
4 INJECTION, SOLUTION INTRAMUSCULAR; INTRAVENOUS EVERY 2 HOUR PRN
Status: DISCONTINUED | OUTPATIENT
Start: 2017-10-11 | End: 2017-10-13

## 2017-10-11 RX ORDER — ASPIRIN 81 MG/1
81 TABLET ORAL DAILY
Status: DISCONTINUED | OUTPATIENT
Start: 2017-10-12 | End: 2017-10-13

## 2017-10-11 RX ORDER — SODIUM CHLORIDE 9 MG/ML
INJECTION, SOLUTION INTRAVENOUS CONTINUOUS
Status: ACTIVE | OUTPATIENT
Start: 2017-10-11 | End: 2017-10-11

## 2017-10-11 RX ORDER — METRONIDAZOLE 500 MG/1
500 TABLET ORAL 3 TIMES DAILY
COMMUNITY
End: 2017-12-27

## 2017-10-11 RX ORDER — FINASTERIDE 5 MG/1
5 TABLET, FILM COATED ORAL
Status: DISCONTINUED | OUTPATIENT
Start: 2017-10-12 | End: 2017-10-13

## 2017-10-11 RX ORDER — DONEPEZIL HYDROCHLORIDE 10 MG/1
10 TABLET, FILM COATED ORAL NIGHTLY
Status: DISCONTINUED | OUTPATIENT
Start: 2017-10-11 | End: 2017-10-13

## 2017-10-11 RX ORDER — POTASSIUM CHLORIDE 20 MEQ/1
40 TABLET, EXTENDED RELEASE ORAL ONCE
Status: COMPLETED | OUTPATIENT
Start: 2017-10-11 | End: 2017-10-11

## 2017-10-11 RX ORDER — CARVEDILOL 3.12 MG/1
3.12 TABLET ORAL 2 TIMES DAILY WITH MEALS
Status: DISCONTINUED | OUTPATIENT
Start: 2017-10-11 | End: 2017-10-13

## 2017-10-11 RX ORDER — MEMANTINE HYDROCHLORIDE 10 MG/1
10 TABLET ORAL 2 TIMES DAILY
Status: DISCONTINUED | OUTPATIENT
Start: 2017-10-11 | End: 2017-10-13

## 2017-10-11 RX ORDER — ROSUVASTATIN CALCIUM 10 MG/1
5 TABLET, COATED ORAL NIGHTLY
Status: DISCONTINUED | OUTPATIENT
Start: 2017-10-11 | End: 2017-10-13

## 2017-10-11 RX ORDER — ONDANSETRON 2 MG/ML
4 INJECTION INTRAMUSCULAR; INTRAVENOUS EVERY 6 HOURS PRN
Status: DISCONTINUED | OUTPATIENT
Start: 2017-10-11 | End: 2017-10-13

## 2017-10-11 RX ORDER — METRONIDAZOLE 500 MG/100ML
500 INJECTION, SOLUTION INTRAVENOUS EVERY 8 HOURS
Status: DISCONTINUED | OUTPATIENT
Start: 2017-10-11 | End: 2017-10-13

## 2017-10-11 RX ORDER — ALFUZOSIN HYDROCHLORIDE 10 MG/1
10 TABLET, EXTENDED RELEASE ORAL
Status: DISCONTINUED | OUTPATIENT
Start: 2017-10-12 | End: 2017-10-13

## 2017-10-11 RX ORDER — HEPARIN SODIUM 5000 [USP'U]/ML
5000 INJECTION, SOLUTION INTRAVENOUS; SUBCUTANEOUS EVERY 8 HOURS SCHEDULED
Status: DISCONTINUED | OUTPATIENT
Start: 2017-10-11 | End: 2017-10-13

## 2017-10-11 RX ORDER — LISINOPRIL 2.5 MG/1
2.5 TABLET ORAL DAILY
Status: DISCONTINUED | OUTPATIENT
Start: 2017-10-12 | End: 2017-10-13

## 2017-10-11 RX ORDER — MORPHINE SULFATE 4 MG/ML
2 INJECTION, SOLUTION INTRAMUSCULAR; INTRAVENOUS EVERY 2 HOUR PRN
Status: DISCONTINUED | OUTPATIENT
Start: 2017-10-11 | End: 2017-10-13

## 2017-10-11 RX ORDER — POTASSIUM CHLORIDE 1.5 G/1.77G
20 POWDER, FOR SOLUTION ORAL DAILY
Qty: 4 PACKET | Refills: 0 | Status: SHIPPED | OUTPATIENT
Start: 2017-10-11 | End: 2017-10-13

## 2017-10-11 RX ORDER — MORPHINE SULFATE 4 MG/ML
1 INJECTION, SOLUTION INTRAMUSCULAR; INTRAVENOUS EVERY 2 HOUR PRN
Status: DISCONTINUED | OUTPATIENT
Start: 2017-10-11 | End: 2017-10-13

## 2017-10-11 RX ORDER — ONDANSETRON 2 MG/ML
4 INJECTION INTRAMUSCULAR; INTRAVENOUS EVERY 4 HOURS PRN
Status: DISCONTINUED | OUTPATIENT
Start: 2017-10-11 | End: 2017-10-13

## 2017-10-11 RX ORDER — PAROXETINE HYDROCHLORIDE 20 MG/1
40 TABLET, FILM COATED ORAL EVERY MORNING
Status: DISCONTINUED | OUTPATIENT
Start: 2017-10-12 | End: 2017-10-13

## 2017-10-11 RX ORDER — ACETAMINOPHEN 325 MG/1
650 TABLET ORAL EVERY 6 HOURS PRN
Status: DISCONTINUED | OUTPATIENT
Start: 2017-10-11 | End: 2017-10-13

## 2017-10-11 NOTE — PLAN OF CARE
NURSING ADMISSION NOTE      Patient admitted via Cart  Oriented to room. Safety precautions initiated. Bed in low position. Call light in reach.     Patient admitted to CTU 3, oriented to his room and surroundings  No complaint of pain or discomfort

## 2017-10-11 NOTE — ED INITIAL ASSESSMENT (HPI)
Admitted twice in the last month for abdominal and C Diff just discharged yesterday from [facility] Wife states patient wont eat and lower abdominal pain continues

## 2017-10-11 NOTE — ED PROVIDER NOTES
Patient Seen in: [facility] Emergency Department    History   Patient presents with:  Abdomen/Flank Pain (GI/)    Stated Complaint: Abd pain discharged yesterday from [facility]    HPI    82-year-old male brought in by family for generalized fatigue, abd Mayuri Murphy; Location: [facility]  3/15/2016: PATIENT WITH PREOPERATIVE ORDER FOR IV ANTIBIO* N/A      Comment: Procedure: SEPTORHINOPLASTY WITH OSTEOTOMIES                AND  GRAFTS;  Surgeon: Aly Quan kg   SpO2 98%   BMI 22.05 kg/m²         Physical Exam    General:  Vitals as listed. No acute distress   HEENT: Sclerae anicteric. Conjunctivae show no pallor.   Oropharynx clear, mucous membranes moist   Neck: supple, no rigidity   Lungs: good air exchan currently undergoing antibiotic treatment for C. difficile presents with lower abdominal pain and poor p.o. intake. Patient with hypokalemia, persistent abdominal pain, poor p.o. intake.   Discussed with family who reports that the patient has had nothin

## 2017-10-11 NOTE — H&P
[facility]  History and Physical     Allie Ngo Patient Status:  Emergency    1935 MRN [id number]   Location [address] [facility] Attending Amber Amin MD   Hosp Day # 0 PCP Jessika Whitney DO     Chief Complaint: A Comment: Procedure: SEPTORHINOPLASTY WITH OSTEOTOMIES                AND  GRAFTS;  Surgeon: Bandar Moura;                  Location: [facility]  3/15/2016: RECONSTR NOSE+SANDRA SEPTAL REPAIR N/A      Comment: Procedure: SEPTORHINOPLASTY WITH OSTEOTO Tab TAKE 1 TABLET(2.5 MG) BY MOUTH EVERY DAY Disp: 90 tablet Rfl: 2   Ipratropium Bromide 0.03 % Nasal Solution  Disp:  Rfl: 0   Donepezil HCl 10 MG Oral Tab  Disp:  Rfl: 2   Memantine HCl 10 MG Oral Tab Take 1 tablet (10 mg total) by mouth 2 (two) times d colitis  1. Admit  2. Clear liquid diet  3. Vanco 125 q 6  4. Flagyl > IV if causing issues with appetite  5. Isolation  6. Await CT  7. GI consult   2. Leukocytosis  1. Monitor  3. Transaminitis  1. Await CT  4. Hypokalemia  1. Replete  2. Check mag  5.  C

## 2017-10-12 PROCEDURE — 99497 ADVNCD CARE PLAN 30 MIN: CPT | Performed by: NURSE PRACTITIONER

## 2017-10-12 PROCEDURE — 99233 SBSQ HOSP IP/OBS HIGH 50: CPT | Performed by: HOSPITALIST

## 2017-10-12 RX ORDER — POTASSIUM CHLORIDE 20 MEQ/1
40 TABLET, EXTENDED RELEASE ORAL EVERY 4 HOURS
Status: COMPLETED | OUTPATIENT
Start: 2017-10-12 | End: 2017-10-12

## 2017-10-12 RX ORDER — POTASSIUM CHLORIDE 20 MEQ/1
40 TABLET, EXTENDED RELEASE ORAL EVERY 4 HOURS
Status: COMPLETED | OUTPATIENT
Start: 2017-10-12 | End: 2017-10-13

## 2017-10-12 NOTE — PHYSICAL THERAPY NOTE
PHYSICAL THERAPY QUICK EVALUATION - INPATIENT    Room Number: 2439/4106-R  Evaluation Date: 10/12/2017  Presenting Problem: Clostridium Difficile colitis  Physician Order: PT Eval and Treat    Problem List  Principal Problem:    Clostridium difficile col OSTEOTOMIES                AND  GRAFTS;  Surgeon: Sylvester Vee;                  Location: [facility]  No date: SINUS SURGERY    No date: TONSILLECTOMY    HOME SITUATION  Type of Home: House   Home Layout: One level  Stairs to Enter : 0     Stair 150'  Assistive Device: None  Pattern: Comment (decreased arm swing B, short step length)  Stoop/Curb Assistance: Not tested       Skilled Therapy Provided: Pt demonstrated supine with head of bed raised to sitting at EOB independently.  Pt ambulated 150' w Recommendations: Home    PLAN  Patient has been evaluated and presents with no skilled Physical Therapy needs at this time. Patient discharged from Physical Therapy services. Please re-order if a new functional limitation presents during this admission.

## 2017-10-12 NOTE — CONSULTS
Issa  [id number]  Hospital Day #1  Date of Consult: 10/12/17       Reason for Consultation: Consult requested for evaluation of palliative care needs and goals of care discussion.     Hi SEPTORHINOPLASTY WITH OSTEOTOMIES                AND  GRAFTS;  Surgeon: Sarah Perkins; Location: [facility]  No date: SINUS SURGERY    No date: TONSILLECTOMY    Social History: , lives at home with spouse.     Allergies: No continue to follow.     SHANNON Sanchez  Palliative Care NP  Pager [phone], Phone [phone]  10/12/2017  5:45 PM

## 2017-10-12 NOTE — PALLIATIVE CARE NOTE
Discussed pending palliative care consult with Dr. Blaise Wilson. Message left for pt's spouse to arrange a time for goals of care discussion. Full note to follow.

## 2017-10-12 NOTE — OCCUPATIONAL THERAPY NOTE
OCCUPATIONAL THERAPY QUICK EVALUATION - INPATIENT    Room Number: 9141/9114-Y  Evaluation Date: 10/12/2017     Type of Evaluation: Quick Eval  Presenting Problem: C-Difficile    Physician Order: IP Consult to Occupational Therapy  Reason for Therapy:  ADL/ SEPTORHINOPLASTY WITH OSTEOTOMIES                AND  GRAFTS;  Surgeon: Becca Mendoza;                  Location: [facility]  3/15/2016: PATIENT WITH PREOPERATIVE ORDER FOR IV ANTIBIO* N/A      Comment: Procedure: SEPTORHINOPLASTY WITH OSTEOTOMIES Opposition  Coordination - Finger to Nose: Symmetrical  Coordination - Rapid Alternating Movement: Symmetrical  Coordination - Finger Opposition: Symmetrical       ACTIVITIES OF DAILY LIVING ASSESSMENT  AM-PAC ‘6-Clicks’ Inpatient Daily Activity Short Form review of history including review of medical or therapy record   Specific performance deficits impacting engagement in ADL/IADL  MODERATE  3 - 5 performance deficits   Client Assessment/Performance Deficits  MODERATE - Comorbidities and min to mod modific

## 2017-10-12 NOTE — CONSULTS
[facility]                       Gastroenterology [address] Gastroenterology    Debi Rowe Patient Status:  Inpatient    1935 MRN [id number]   [facility] 3NE-A Attending Adrian Lopez MD   [facility] Day # 1 PCP Karina Pabon Sciatica      PSHx: Past Surgical History:  3/1/00: CABG  12/1/00: CHOLECYSTECTOMY  No date: OTHER SURGICAL HISTORY      Comment: prostate surgery  3/15/2016: PATIENT DOCUMENTED NOT TO HAVE EXPERIENCED ANY* N/A      Comment: Procedure: SEPTORHINOPLASTY WIT acetaminophen (TYLENOL) tab 650 mg 650 mg Oral Q6H PRN   ondansetron HCl (ZOFRAN) injection 4 mg 4 mg Intravenous Q6H PRN   carvedilol (COREG) tab 3.125 mg 3.125 mg Oral BID with meals   aspirin EC tab 81 mg 81 mg Oral Daily   Donepezil HCl (ARICEPT) tab ENT: The patient reports no hoarseness of voice, hearing loss, sinus congestion, tinnitus           Neurologic: + dementia   PE: /87 (BP Location: Right arm)   Pulse 73   Temp 98.6 °F (37 °C) (Oral)   Resp 18   Ht 5' 8\" (1.727 m)   Wt 145 ONLY)(CPT=74177     COMPARISON:  None. INDICATIONS:  Abdominal pain, history of C. difficile.      TECHNIQUE:  CT scanning was performed from the dome of the diaphragm to the pubic symphysis with non-ionic intravenous contrast material. Post contrast co venous gas, or free air. Other nonacute findings as above.      Dictated by: Nati Monsalve MD on 10/11/2017 at 21:11       Approved by: Nati Monsalve MD    Impression: [de-identified] yr-old male with a hx of CAD s/p CABG s/p PCI, dementia, prostate cancer, and recu standpoint.   Nanette Stone MD

## 2017-10-12 NOTE — PROGRESS NOTES
[facility]  Progress Note     Beata Etienne Patient Status:  Inpatient    1935 MRN [id number]   [facility] 3NE-A Attending Luciana Epley, MD   [facility] Day # 1 PCP Rachel Tejeda DO     Chief Complaint: Colitis    S: Patient sta Alfuzosin HCl ER  10 mg Oral Daily with breakfast   • Rosuvastatin Calcium  5 mg Oral Nightly   • PARoxetine HCl  40 mg Oral QAM   • vancomycin  125 mg Oral QID       ASSESSMENT / PLAN:     1. Pancolitis  d/t Cdiff colitis   2. Leukocytosis, resolved  3.  Melina Ring

## 2017-10-13 VITALS
HEIGHT: 68 IN | TEMPERATURE: 98 F | OXYGEN SATURATION: 99 % | RESPIRATION RATE: 18 BRPM | SYSTOLIC BLOOD PRESSURE: 156 MMHG | DIASTOLIC BLOOD PRESSURE: 79 MMHG | HEART RATE: 78 BPM | WEIGHT: 145 LBS | BODY MASS INDEX: 21.98 KG/M2

## 2017-10-13 PROCEDURE — 99239 HOSP IP/OBS DSCHRG MGMT >30: CPT | Performed by: HOSPITALIST

## 2017-10-13 RX ORDER — POTASSIUM CHLORIDE 14.9 MG/ML
20 INJECTION INTRAVENOUS ONCE
Status: COMPLETED | OUTPATIENT
Start: 2017-10-13 | End: 2017-10-13

## 2017-10-13 NOTE — PROGRESS NOTES
[facility]  Progress Note     Ankit Whitingo Patient Status:  Inpatient    1935 MRN [id number]   [facility] 3NE-A Attending Anitra Oconnor MD   [address] Day # 2 PCP Edd Joel DO     Chief Complaint: Colitis    S: Patient doi Oral Daily   • Memantine HCl  10 mg Oral BID   • lisinopril  2.5 mg Oral Daily   • metRONIDAZOLE  500 mg Intravenous Q8H   • Alfuzosin HCl ER  10 mg Oral Daily with breakfast   • Rosuvastatin Calcium  5 mg Oral Nightly   • PARoxetine HCl  40 mg Oral QAM

## 2017-10-13 NOTE — HOME CARE LIAISON
MET WITH PTNT TO DISCUSS HOME HEALTH SERVICES AND COVERAGE CRITERIA. PTNT AGREEABLE TO Darron Nunez. PTNT GIVEN RESIDENTIAL BROCHURE. RESIDENTIAL WITH PROVIDE SN/PT ON DISCHARGE.     Thank you for this referral,   Anila Yates

## 2017-10-13 NOTE — CM/SW NOTE
YVNONE informed pt will possibly d/c today. YVONNE informed [facility] liaison, order entered for home health RN.

## 2017-10-13 NOTE — PLAN OF CARE
PT GETTING IRRITABLE AND WANTING TO LEAVE. DIET ADV TO LOW FIBER. Amanda 73 AFTER LUNCH. WIFE AT BEDSIDE.  CALL OUT TO GI TO FIND OUT TIME WHEN DR WILL BE ON UNIT PER WIFE REQUEST

## 2017-10-13 NOTE — PALLIATIVE CARE NOTE
PCSW met with pt and provided copy of POLST form to pt. Pt and PCSW discussed Palliative Care at d/c. Pt agreeable to this plan and states he really thinks he could benefit from Palliative Care at home.  PCSW confirmed with [facility]riam that their P

## 2017-10-13 NOTE — PLAN OF CARE
GASTROINTESTINAL - ADULT    • Minimal or absence of nausea and vomiting Not Progressing    • Maintains or returns to baseline bowel function Not Progressing        METABOLIC/FLUID AND ELECTROLYTES - ADULT    • Electrolytes maintained within normal limits N

## 2017-10-13 NOTE — PLAN OF CARE
Out in vazquez, yelling & swearing that he wants to go.  DR Quincy Hernandeze aware & trying to resolve situation

## 2017-10-14 NOTE — DISCHARGE SUMMARY
[facility]  DISCHARGE SUMMARY     Jettie Stain Patient Status:  Inpatient    1935 MRN [id number]   [facility] 3NE-A Attending No att. providers found   Hosp Day # 2 PCP Anita Glynn DO     Date of Admission: 10/11/2017  Matt with electrolyte abnormalities. Patient underwent CT a/p which revealed pancolitis d/t Cdiff. Patent was admitted, started on IVF, treatment for Ciff with GI on consult. Patient with clinical improvement. Diet initiated and tolerated.  He was seen by caron 0.4 MG Caps  Commonly known as:  FLOMAX      TAKE 1 CAPSULE BY MOUTH EVERY DAY   Quantity:  90 capsule  Refills:  0     Vancomycin HCl 125 MG Caps  Commonly known as:  VANCOCIN  Notes to patient:  Continue same med. 125 mg.    Refills:  0            Fo

## 2017-10-16 ENCOUNTER — PATIENT OUTREACH (OUTPATIENT)
Dept: CASE MANAGEMENT | Age: 82
End: 2017-10-16

## 2017-10-16 ENCOUNTER — TELEPHONE (OUTPATIENT)
Dept: FAMILY MEDICINE CLINIC | Facility: CLINIC | Age: 82
End: 2017-10-16

## 2017-10-16 NOTE — TELEPHONE ENCOUNTER
Looking for verbal that patient will be managed by [facility] after hospital release. PT and Pallative care  Paramjit Granger needs ASAP as not to hold up patients paperwork.

## 2017-10-18 ENCOUNTER — TELEPHONE (OUTPATIENT)
Dept: INTERNAL MEDICINE CLINIC | Facility: CLINIC | Age: 82
End: 2017-10-18

## 2017-10-18 NOTE — PROGRESS NOTES
10/18/17 MICHAEL spoke with patient's wife Allen Beard, she states that Dheeraj Lynn was admitted to [facility] on 10/16/17. MICHAEL sent message to MD's office. Closed TCM episode.

## 2017-10-18 NOTE — TELEPHONE ENCOUNTER
MICHAEL GUILLEN spoke with patient's wife Rayray Duran who reports that Cristy Morelos was admitted to [facility] on 10/16/17.

## 2017-11-02 ENCOUNTER — TELEPHONE (OUTPATIENT)
Dept: FAMILY MEDICINE CLINIC | Facility: CLINIC | Age: 82
End: 2017-11-02

## 2017-11-02 NOTE — TELEPHONE ENCOUNTER
Long term care papework received - previously completed by CLAUDIA but needs some updating.   See request - triage bin

## 2017-11-03 ENCOUNTER — TELEPHONE (OUTPATIENT)
Dept: FAMILY MEDICINE CLINIC | Facility: CLINIC | Age: 82
End: 2017-11-03

## 2017-11-07 NOTE — TELEPHONE ENCOUNTER
Form revised to read \"recieving 4-10 hours of care per day\" signed by Mabelene Angelucci. Faxed back to [place]. Sent to scan, with copy in triage accordian.

## 2017-11-30 ENCOUNTER — TELEPHONE (OUTPATIENT)
Dept: FAMILY MEDICINE CLINIC | Facility: CLINIC | Age: 82
End: 2017-11-30

## 2017-11-30 NOTE — TELEPHONE ENCOUNTER
Pt d/c'd from hospital on Tuesday.   Calling for [facility] orders, would like to start tomorrow

## 2017-12-04 ENCOUNTER — OFFICE VISIT (OUTPATIENT)
Dept: FAMILY MEDICINE CLINIC | Facility: CLINIC | Age: 82
End: 2017-12-04

## 2017-12-04 VITALS
HEIGHT: 69 IN | TEMPERATURE: 98 F | DIASTOLIC BLOOD PRESSURE: 78 MMHG | HEART RATE: 72 BPM | OXYGEN SATURATION: 99 % | SYSTOLIC BLOOD PRESSURE: 110 MMHG | RESPIRATION RATE: 16 BRPM | BODY MASS INDEX: 20.73 KG/M2 | WEIGHT: 140 LBS

## 2017-12-04 DIAGNOSIS — G30.8 ALZHEIMER'S DISEASE OF OTHER ONSET WITHOUT BEHAVIORAL DISTURBANCE: ICD-10-CM

## 2017-12-04 DIAGNOSIS — F02.80 ALZHEIMER'S DISEASE OF OTHER ONSET WITHOUT BEHAVIORAL DISTURBANCE: ICD-10-CM

## 2017-12-04 DIAGNOSIS — A04.72 C. DIFFICILE COLITIS: Primary | ICD-10-CM

## 2017-12-04 DIAGNOSIS — Z00.00 ROUTINE GENERAL MEDICAL EXAMINATION AT A HEALTH CARE FACILITY: ICD-10-CM

## 2017-12-04 PROCEDURE — G0439 PPPS, SUBSEQ VISIT: HCPCS | Performed by: FAMILY MEDICINE

## 2017-12-04 PROCEDURE — 99213 OFFICE O/P EST LOW 20 MIN: CPT | Performed by: FAMILY MEDICINE

## 2017-12-04 RX ORDER — VANCOMYCIN HYDROCHLORIDE 250 MG/1
CAPSULE ORAL
Refills: 0 | COMMUNITY
Start: 2017-10-09 | End: 2017-12-27

## 2017-12-04 NOTE — PROGRESS NOTES
Here with loving wife. Just recuperating from Clostridium difficile enteritis. He is still finishing up for the next several weeks his vancomycin in a tapering dose he is symptom-free. We discussed his issues with ongoing dementia.   His wife insists t

## 2017-12-05 NOTE — PROGRESS NOTES
Patient is here for Medicare annual wellness visit, accompanied by wife. Please refer to the template that was obliged. Tuning fork exam was unremarkable. He does see an eye doctor on a regular basis.   He is up-to-date on immunizations including this

## 2017-12-18 RX ORDER — ROSUVASTATIN CALCIUM 5 MG/1
TABLET, COATED ORAL
Qty: 90 TABLET | Refills: 0 | Status: SHIPPED | OUTPATIENT
Start: 2017-12-18 | End: 2017-12-27

## 2017-12-18 NOTE — PROGRESS NOTES
Chris Paul is a [de-identified]year old male who presents for a Medicare Annual Wellness visit.     Patient Care Team: Patient Care Team:  Selma Bautista DO as PCP - [facility])  Paula Alejo MD as Consulting Physician ([address] [place] [address])  Jaylin Cardenas Alteration of awareness     Urinary retention     Hypokalemia     Coronary artery disease involving native heart without angina pectoris     Dyslipidemia     Dementia without behavioral disturbance     Atherosclerosis of native coronary artery of native he CHOLEST 171 12/02/2013       Lab Results  Component Value Date   HDL 63 02/28/2016   HDL 66 12/14/2015   HDL 60 12/02/2013       Lab Results  Component Value Date   TRIG 43 02/28/2016   TRIG 65 12/14/2015   TRIG 60 12/02/2013       Lab Results  Component your medications?: Yes    Hearing Problems?: Yes     Functional Status     Hearing Problems?: Yes    Vision Problems? : Yes    Difficulty walking?: No    Difficulty dressing or bathing?: No    Problems with daily activities? : No    Memory Problems?: Yes Colonoscopy Screen every 10 years There are no preventive care reminders to display for this patient. Update Health Maintenance if applicable    Flex Sigmoidoscopy Screen every 5 years No results found for this or any previous visit.  No flowsheet data foun 11/01/2016 5.1    No flowsheet data found.     Creat/alb ratio  Annually      LDL  Annually LDL-CHOLESTEROL (mg/dL (calc))   Date Value   10/19/2011 116     LDL CHOLESTROL (mg/dL)   Date Value   12/02/2013 99     LDL Cholesterol (mg/dL)   Date Value   02/ eczema, due to unspecified cause    • Coronary atherosclerosis     bypass surg x4 with 1 stent placed   • Dementia    • Dermatophytosis of the body    • Enthesopathy of unspecified site    • Essential hypertension    • Heart disease    • High blood pressur Alcohol use: Yes           4.2 oz/week     Glasses of wine: 7 per week     Comment: 1-2 glasses of wine/day    Occ: Retired      EXAM:   /78   Pulse 72   Temp 98.3 °F (36.8 °C) (Temporal)   Resp 16   Ht 69\"   Wt 140 lb   SpO2 99%   BMI 20.67 kg/m²

## 2017-12-19 DIAGNOSIS — F02.80 ALZHEIMER'S DISEASE OF OTHER ONSET WITHOUT BEHAVIORAL DISTURBANCE: ICD-10-CM

## 2017-12-19 DIAGNOSIS — F02.80 LATE ONSET ALZHEIMER'S DISEASE WITHOUT BEHAVIORAL DISTURBANCE (HCC): ICD-10-CM

## 2017-12-19 DIAGNOSIS — G30.1 LATE ONSET ALZHEIMER'S DISEASE WITHOUT BEHAVIORAL DISTURBANCE (HCC): ICD-10-CM

## 2017-12-19 DIAGNOSIS — E78.00 PURE HYPERCHOLESTEROLEMIA: ICD-10-CM

## 2017-12-19 DIAGNOSIS — G30.8 ALZHEIMER'S DISEASE OF OTHER ONSET WITHOUT BEHAVIORAL DISTURBANCE: ICD-10-CM

## 2017-12-19 DIAGNOSIS — I42.0 DCM (DILATED CARDIOMYOPATHY) (HCC): ICD-10-CM

## 2017-12-19 RX ORDER — FINASTERIDE 5 MG/1
TABLET, FILM COATED ORAL
Qty: 90 TABLET | Refills: 0 | Status: SHIPPED | OUTPATIENT
Start: 2017-12-19 | End: 2017-12-27

## 2017-12-19 RX ORDER — PAROXETINE HYDROCHLORIDE 40 MG/1
TABLET, FILM COATED ORAL
Qty: 90 TABLET | Refills: 0 | Status: SHIPPED | OUTPATIENT
Start: 2017-12-19 | End: 2017-12-27

## 2017-12-19 RX ORDER — TAMSULOSIN HYDROCHLORIDE 0.4 MG/1
CAPSULE ORAL
Qty: 90 CAPSULE | Refills: 0 | Status: SHIPPED | OUTPATIENT
Start: 2017-12-19 | End: 2017-12-27

## 2017-12-19 NOTE — TELEPHONE ENCOUNTER
Pt has not been seen in over 1 year.     Medication: DONEPEZIL HCL 10 MG Oral Tab and MEMANTINE HCL 10 MG Oral Tab    Date of last refill: 5/3/2016  [facility]    Last office visit: Visit date not found  Due back to clin

## 2017-12-20 RX ORDER — DONEPEZIL HYDROCHLORIDE 10 MG/1
TABLET, FILM COATED ORAL
Qty: 180 TABLET | Refills: 0 | Status: SHIPPED | OUTPATIENT
Start: 2017-12-20 | End: 2017-12-27

## 2017-12-20 RX ORDER — MEMANTINE HYDROCHLORIDE 10 MG/1
TABLET ORAL
Qty: 180 TABLET | Refills: 0 | Status: SHIPPED | OUTPATIENT
Start: 2017-12-20 | End: 2017-12-27

## 2017-12-20 NOTE — TELEPHONE ENCOUNTER
Spoke with patient's wife (OK per HIPAA) and relayed need for follow up appt. She agreeable to scheduling a f/u appt. Accepted appt on 1/11/18 at 10am.  Appt placed on hold. [facility]     Rx pended to provide medication up to ap

## 2017-12-27 ENCOUNTER — APPOINTMENT (OUTPATIENT)
Dept: CT IMAGING | Facility: HOSPITAL | Age: 82
DRG: 373 | End: 2017-12-27
Attending: EMERGENCY MEDICINE
Payer: MEDICARE

## 2017-12-27 ENCOUNTER — HOSPITAL ENCOUNTER (INPATIENT)
Facility: HOSPITAL | Age: 82
LOS: 2 days | Discharge: HOME | DRG: 373 | End: 2017-12-30
Attending: EMERGENCY MEDICINE | Admitting: STUDENT IN AN ORGANIZED HEALTH CARE EDUCATION/TRAINING PROGRAM
Payer: MEDICARE

## 2017-12-27 ENCOUNTER — TELEPHONE (OUTPATIENT)
Dept: FAMILY MEDICINE CLINIC | Facility: CLINIC | Age: 82
End: 2017-12-27

## 2017-12-27 DIAGNOSIS — K52.9 ACUTE COLITIS: ICD-10-CM

## 2017-12-27 DIAGNOSIS — R10.9 ABDOMINAL PAIN, ACUTE: Primary | ICD-10-CM

## 2017-12-27 DIAGNOSIS — R19.7 DIARRHEA OF PRESUMED INFECTIOUS ORIGIN: ICD-10-CM

## 2017-12-27 DIAGNOSIS — R53.1 WEAKNESS GENERALIZED: ICD-10-CM

## 2017-12-27 PROBLEM — R79.89 AZOTEMIA: Status: ACTIVE | Noted: 2017-12-27

## 2017-12-27 PROBLEM — R73.9 HYPERGLYCEMIA: Status: ACTIVE | Noted: 2017-12-27

## 2017-12-27 PROBLEM — D72.829 LEUKOCYTOSIS: Status: ACTIVE | Noted: 2017-12-27

## 2017-12-27 LAB
ALBUMIN SERPL-MCNC: 3.4 G/DL (ref 3.5–4.8)
ALP LIVER SERPL-CCNC: 52 U/L (ref 45–117)
ALT SERPL-CCNC: 20 U/L (ref 17–63)
APTT PPP: 26.3 SECONDS (ref 25–34)
AST SERPL-CCNC: 16 U/L (ref 15–41)
BASOPHILS # BLD AUTO: 0.1 X10(3) UL (ref 0–0.1)
BASOPHILS NFR BLD AUTO: 0.7 %
BILIRUB SERPL-MCNC: 1.4 MG/DL (ref 0.1–2)
BILIRUB UR QL STRIP.AUTO: NEGATIVE
BUN BLD-MCNC: 21 MG/DL (ref 8–20)
CALCIUM BLD-MCNC: 9.2 MG/DL (ref 8.3–10.3)
CHLORIDE: 108 MMOL/L (ref 101–111)
CLARITY UR REFRACT.AUTO: CLEAR
CO2: 27 MMOL/L (ref 22–32)
COLOR UR AUTO: YELLOW
CREAT BLD-MCNC: 0.98 MG/DL (ref 0.7–1.3)
EOSINOPHIL # BLD AUTO: 1.36 X10(3) UL (ref 0–0.3)
EOSINOPHIL NFR BLD AUTO: 9.3 %
ERYTHROCYTE [DISTWIDTH] IN BLOOD BY AUTOMATED COUNT: 13.2 % (ref 11.5–16)
GLUCOSE BLD-MCNC: 108 MG/DL (ref 70–99)
GLUCOSE UR STRIP.AUTO-MCNC: NEGATIVE MG/DL
HCT VFR BLD AUTO: 37.3 % (ref 37–53)
HGB BLD-MCNC: 12.5 G/DL (ref 13–17)
IMMATURE GRANULOCYTE COUNT: 0.04 X10(3) UL (ref 0–1)
IMMATURE GRANULOCYTE RATIO %: 0.3 %
INR BLD: 1.07 (ref 0.89–1.11)
KETONES UR STRIP.AUTO-MCNC: NEGATIVE MG/DL
LYMPHOCYTES # BLD AUTO: 1.18 X10(3) UL (ref 0.9–4)
LYMPHOCYTES NFR BLD AUTO: 8 %
M PROTEIN MFR SERPL ELPH: 7.4 G/DL (ref 6.1–8.3)
MCH RBC QN AUTO: 32.5 PG (ref 27–33.2)
MCHC RBC AUTO-ENTMCNC: 33.5 G/DL (ref 31–37)
MCV RBC AUTO: 96.9 FL (ref 80–99)
MONOCYTES # BLD AUTO: 1.53 X10(3) UL (ref 0.1–0.6)
MONOCYTES NFR BLD AUTO: 10.4 %
NEUTROPHIL ABS PRELIM: 10.46 X10 (3) UL (ref 1.3–6.7)
NEUTROPHILS # BLD AUTO: 10.46 X10(3) UL (ref 1.3–6.7)
NEUTROPHILS NFR BLD AUTO: 71.3 %
NITRITE UR QL STRIP.AUTO: NEGATIVE
PH UR STRIP.AUTO: 5 [PH] (ref 4.5–8)
PLATELET # BLD AUTO: 250 10(3)UL (ref 150–450)
POTASSIUM SERPL-SCNC: 3.7 MMOL/L (ref 3.6–5.1)
PROT UR STRIP.AUTO-MCNC: NEGATIVE MG/DL
PSA SERPL DL<=0.01 NG/ML-MCNC: 13.9 SECONDS (ref 12–14.3)
RBC # BLD AUTO: 3.85 X10(6)UL (ref 3.8–5.8)
RED CELL DISTRIBUTION WIDTH-SD: 46.6 FL (ref 35.1–46.3)
SODIUM SERPL-SCNC: 142 MMOL/L (ref 136–144)
SP GR UR STRIP.AUTO: >1.06 (ref 1–1.03)
UROBILINOGEN UR STRIP.AUTO-MCNC: <2 MG/DL
WBC # BLD AUTO: 14.7 X10(3) UL (ref 4–13)

## 2017-12-27 PROCEDURE — 74177 CT ABD & PELVIS W/CONTRAST: CPT | Performed by: EMERGENCY MEDICINE

## 2017-12-27 PROCEDURE — 99223 1ST HOSP IP/OBS HIGH 75: CPT | Performed by: HOSPITALIST

## 2017-12-27 RX ORDER — MEMANTINE HYDROCHLORIDE 10 MG/1
10 TABLET ORAL 2 TIMES DAILY
COMMUNITY
End: 2018-01-11

## 2017-12-27 RX ORDER — FLUTICASONE PROPIONATE 50 MCG
1 SPRAY, SUSPENSION (ML) NASAL 2 TIMES DAILY
COMMUNITY
End: 2018-01-11

## 2017-12-27 RX ORDER — ROSUVASTATIN CALCIUM 5 MG/1
5 TABLET, COATED ORAL NIGHTLY
Status: DISCONTINUED | OUTPATIENT
Start: 2017-12-27 | End: 2017-12-30

## 2017-12-27 RX ORDER — FINASTERIDE 5 MG/1
5 TABLET, FILM COATED ORAL DAILY
COMMUNITY
End: 2018-03-19

## 2017-12-27 RX ORDER — ASPIRIN 81 MG/1
81 TABLET, CHEWABLE ORAL DAILY
Status: DISCONTINUED | OUTPATIENT
Start: 2017-12-27 | End: 2017-12-30

## 2017-12-27 RX ORDER — FINASTERIDE 5 MG/1
5 TABLET, FILM COATED ORAL DAILY
Status: DISCONTINUED | OUTPATIENT
Start: 2017-12-27 | End: 2017-12-30

## 2017-12-27 RX ORDER — DONEPEZIL HYDROCHLORIDE 10 MG/1
10 TABLET, FILM COATED ORAL NIGHTLY
Status: DISCONTINUED | OUTPATIENT
Start: 2017-12-27 | End: 2017-12-30

## 2017-12-27 RX ORDER — SODIUM CHLORIDE 9 MG/ML
INJECTION, SOLUTION INTRAVENOUS CONTINUOUS
Status: DISCONTINUED | OUTPATIENT
Start: 2017-12-27 | End: 2017-12-30

## 2017-12-27 RX ORDER — ONDANSETRON 2 MG/ML
4 INJECTION INTRAMUSCULAR; INTRAVENOUS EVERY 4 HOURS PRN
Status: DISCONTINUED | OUTPATIENT
Start: 2017-12-27 | End: 2017-12-27 | Stop reason: DRUGHIGH

## 2017-12-27 RX ORDER — SODIUM CHLORIDE 9 MG/ML
1000 INJECTION, SOLUTION INTRAVENOUS ONCE
Status: COMPLETED | OUTPATIENT
Start: 2017-12-27 | End: 2017-12-27

## 2017-12-27 RX ORDER — POTASSIUM CHLORIDE 20 MEQ/1
40 TABLET, EXTENDED RELEASE ORAL ONCE
Status: COMPLETED | OUTPATIENT
Start: 2017-12-27 | End: 2017-12-27

## 2017-12-27 RX ORDER — PAROXETINE HYDROCHLORIDE 40 MG/1
40 TABLET, FILM COATED ORAL EVERY MORNING
COMMUNITY
End: 2018-03-19

## 2017-12-27 RX ORDER — SODIUM CHLORIDE 9 MG/ML
INJECTION, SOLUTION INTRAVENOUS CONTINUOUS
Status: ACTIVE | OUTPATIENT
Start: 2017-12-27 | End: 2017-12-27

## 2017-12-27 RX ORDER — CARVEDILOL 3.12 MG/1
3.12 TABLET ORAL 2 TIMES DAILY WITH MEALS
COMMUNITY
End: 2018-04-04

## 2017-12-27 RX ORDER — LISINOPRIL 2.5 MG/1
2.5 TABLET ORAL DAILY
Status: DISCONTINUED | OUTPATIENT
Start: 2017-12-27 | End: 2017-12-30

## 2017-12-27 RX ORDER — ONDANSETRON 2 MG/ML
4 INJECTION INTRAMUSCULAR; INTRAVENOUS EVERY 6 HOURS PRN
Status: DISCONTINUED | OUTPATIENT
Start: 2017-12-27 | End: 2017-12-30

## 2017-12-27 RX ORDER — MEMANTINE HYDROCHLORIDE 10 MG/1
10 TABLET ORAL 2 TIMES DAILY
Status: DISCONTINUED | OUTPATIENT
Start: 2017-12-27 | End: 2017-12-30

## 2017-12-27 RX ORDER — HEPARIN SODIUM 5000 [USP'U]/ML
5000 INJECTION, SOLUTION INTRAVENOUS; SUBCUTANEOUS EVERY 12 HOURS SCHEDULED
Status: DISCONTINUED | OUTPATIENT
Start: 2017-12-27 | End: 2017-12-30

## 2017-12-27 RX ORDER — ALFUZOSIN HYDROCHLORIDE 10 MG/1
10 TABLET, EXTENDED RELEASE ORAL
Status: DISCONTINUED | OUTPATIENT
Start: 2017-12-28 | End: 2017-12-30

## 2017-12-27 RX ORDER — ACETAMINOPHEN 325 MG/1
650 TABLET ORAL EVERY 6 HOURS PRN
Status: DISCONTINUED | OUTPATIENT
Start: 2017-12-27 | End: 2017-12-30

## 2017-12-27 RX ORDER — CARVEDILOL 3.12 MG/1
3.12 TABLET ORAL 2 TIMES DAILY WITH MEALS
Status: DISCONTINUED | OUTPATIENT
Start: 2017-12-27 | End: 2017-12-30

## 2017-12-27 RX ORDER — LISINOPRIL 2.5 MG/1
2.5 TABLET ORAL DAILY
COMMUNITY
End: 2018-04-25

## 2017-12-27 RX ORDER — TAMSULOSIN HYDROCHLORIDE 0.4 MG/1
0.4 CAPSULE ORAL DAILY
COMMUNITY
End: 2018-03-19

## 2017-12-27 RX ORDER — ROSUVASTATIN CALCIUM 5 MG/1
5 TABLET, COATED ORAL NIGHTLY
COMMUNITY
End: 2018-03-17

## 2017-12-27 RX ORDER — PAROXETINE HYDROCHLORIDE 20 MG/1
40 TABLET, FILM COATED ORAL DAILY
Status: DISCONTINUED | OUTPATIENT
Start: 2017-12-27 | End: 2017-12-30

## 2017-12-27 NOTE — ED PROVIDER NOTES
Patient Seen in: [facility] Emergency Department    History   Patient presents with:  Abdomen/Flank Pain (GI/)    Stated Complaint:     HPI    Patient is an 82-year-old male presents emergency room with a history of diarrhea weakness is been ongoi ANY* N/A      Comment: Procedure: SEPTORHINOPLASTY WITH OSTEOTOMIES                AND  GRAFTS;  Surgeon: Huber Dejesus;                  Location: [facility]  3/15/2016: PATIENT WITH PREOPERATIVE ORDER FOR IV ANTIBIO* N/A      Comment: Procedure: Pupils are 4 mm equally round and reactive to light. Oropharynx is clear. Mucous membranes are somewhat dry. NECK: There is no focal tenderness to palpation appreciated. There is no JVD. No meningeal signs or nuchal rigidity appreciated. No stridor.   LUNG Abnormality         Status                     ---------                               -----------         ------                     CBC W/ DIFFERENTIAL[[id number]]          Abnormal            Final result                 Please view results fo did come and see the patient emergency room. Patient was admitted for further care at this time.           Admission disposition: 12/27/2017 12:09 PM           Disposition and Plan     Clinical Impression:  Abdominal pain, acute  (primary encounter diagnos

## 2017-12-27 NOTE — ED NOTES
Pt up to [facility] w/ foul smelling liquid stool. Stool cx sent to lab. Personal care given.

## 2017-12-27 NOTE — H&P
[facility]  History and Physical     Camilo Christianson Patient Status:  Emergency    1935 MRN [id number]   Location [address] [facility] Attending Cristiana Jaime MD   Hosp Day # 0 PCP Rip Rob DO     Chief Complaint: SEPTORHINOPLASTY WITH OSTEOTOMIES                AND  GRAFTS;  Surgeon: Mitul Navarrete;                  Location: [facility]  3/15/2016: RECONSTR NOSE+SANDRA SEPTAL REPAIR N/A      Comment: Procedure: SEPTORHINOPLASTY WITH OSTEOTOMIES BY MOUTH TWICE DAILY Disp: 180 tablet Rfl: 1   LISINOPRIL 2.5 MG Oral Tab TAKE 1 TABLET(2.5 MG) BY MOUTH EVERY DAY Disp: 90 tablet Rfl: 2   Ipratropium Bromide 0.03 % Nasal Solution  Disp:  Rfl: 0   Donepezil HCl 10 MG Oral Tab  Disp:  Rfl: 2   aspirin 81 CK in the last 72 hours. Imaging: Imaging data reviewed in Epic. ASSESSMENT / PLAN:     1. Recurrent colitis: Highly suspicious for C. difficile, await stool testing.   Start oral vancomycin, consult gastroenterology for consideration of stool trans

## 2017-12-27 NOTE — CONSULTS
[facility]                       Gastroenterology [address] [facility] Gastroenterology    Clearance Lis Patient Status:  Emergency    1935 MRN [id number]   Location [address] Attending Helen Alvarado MD   [facility] Da History:  3/1/00: CABG  12/1/00: CHOLECYSTECTOMY  No date: OTHER SURGICAL HISTORY      Comment: prostate surgery  3/15/2016: PATIENT DOCUMENTED NOT TO HAVE EXPERIENCED ANY* N/A      Comment: Procedure: SEPTORHINOPLASTY WITH OSTEOTOMIES                AND S arthritis, myalgias, arthralgias            Genitourinary:  The patient reports no history of recurrent urinary tract infections, hematuria, dysuria, or nephrolithiasis           Psychiatric: + dementia            Oncologic: + prostate cancer            EN 10.46*   WBC  14.7*   PLT  250.0       Recent Labs   Lab  12/27/17   1009   ALT  20   AST  16       Imaging:   PROCEDURE:  CT ABDOMEN PELVIS IV CONTRAST, NO ORAL (ER)     COMPARISON:  [facility] CT ABDOMEN+PELVIS(CONTRAST ONLY)(CPT=74177), 10/11/2017, 20:55. with mild bladder wall thickening. Clinical correlation with urinalysis is recommended to exclude cystitis.       Dictated by: Mauricio Lopez MD on 12/27/2017 at 11:38       Approved by: Mauricio Lopez MD        Impression: [de-identified] yr-old male with CAD s/

## 2017-12-27 NOTE — TELEPHONE ENCOUNTER
Nurse practitioner called - pt had 3 days of diarrhea, last wk dx with c-diff.   NP sent pt to ER today for additional c-diff testing

## 2017-12-28 LAB
BASOPHILS # BLD AUTO: 0.07 X10(3) UL (ref 0–0.1)
BASOPHILS NFR BLD AUTO: 1 %
BUN BLD-MCNC: 21 MG/DL (ref 8–20)
CALCIUM BLD-MCNC: 8.5 MG/DL (ref 8.3–10.3)
CHLORIDE: 109 MMOL/L (ref 101–111)
CO2: 25 MMOL/L (ref 22–32)
CREAT BLD-MCNC: 0.87 MG/DL (ref 0.7–1.3)
EOSINOPHIL # BLD AUTO: 0.32 X10(3) UL (ref 0–0.3)
EOSINOPHIL NFR BLD AUTO: 4.6 %
ERYTHROCYTE [DISTWIDTH] IN BLOOD BY AUTOMATED COUNT: 13.1 % (ref 11.5–16)
GLUCOSE BLD-MCNC: 104 MG/DL (ref 70–99)
HCT VFR BLD AUTO: 34.1 % (ref 37–53)
HGB BLD-MCNC: 11.3 G/DL (ref 13–17)
IMMATURE GRANULOCYTE COUNT: 0.02 X10(3) UL (ref 0–1)
IMMATURE GRANULOCYTE RATIO %: 0.3 %
LYMPHOCYTES # BLD AUTO: 0.97 X10(3) UL (ref 0.9–4)
LYMPHOCYTES NFR BLD AUTO: 14 %
MCH RBC QN AUTO: 32.7 PG (ref 27–33.2)
MCHC RBC AUTO-ENTMCNC: 33.1 G/DL (ref 31–37)
MCV RBC AUTO: 98.6 FL (ref 80–99)
MONOCYTES # BLD AUTO: 1.47 X10(3) UL (ref 0.1–0.6)
MONOCYTES NFR BLD AUTO: 21.2 %
NEUTROPHIL ABS PRELIM: 4.08 X10 (3) UL (ref 1.3–6.7)
NEUTROPHILS # BLD AUTO: 4.08 X10(3) UL (ref 1.3–6.7)
NEUTROPHILS NFR BLD AUTO: 58.9 %
PLATELET # BLD AUTO: 195 10(3)UL (ref 150–450)
POTASSIUM SERPL-SCNC: 3.7 MMOL/L (ref 3.6–5.1)
RBC # BLD AUTO: 3.46 X10(6)UL (ref 3.8–5.8)
RED CELL DISTRIBUTION WIDTH-SD: 46.8 FL (ref 35.1–46.3)
SODIUM SERPL-SCNC: 143 MMOL/L (ref 136–144)
WBC # BLD AUTO: 6.9 X10(3) UL (ref 4–13)

## 2017-12-28 PROCEDURE — 99232 SBSQ HOSP IP/OBS MODERATE 35: CPT | Performed by: HOSPITALIST

## 2017-12-28 RX ORDER — POTASSIUM CHLORIDE 20 MEQ/1
40 TABLET, EXTENDED RELEASE ORAL ONCE
Status: COMPLETED | OUTPATIENT
Start: 2017-12-28 | End: 2017-12-28

## 2017-12-28 NOTE — PLAN OF CARE
PT ADMITTED , ALERT TO SELF. IMPULSIVE, EASILY REDIRECTED. CDIFF +   AWARE.  MED RECORDS REQUESTED FROM Adrianna Morrison

## 2017-12-28 NOTE — PLAN OF CARE
Maintains or returns to baseline bowel function Not Progressing      Patient will exhibit improved attention, thought processing and/or memory Not Progressing      Achieves stable or improved neurological status Not Progressing      Maintains optimal cardi

## 2017-12-28 NOTE — PROGRESS NOTES
Gastroenterology Progress Note  Celso Farrell Patient Status:  Inpatient    1935 MRN [id number]   [facility] 4NW-A Attending Cara Bolton MD   Hosp Day # 0 PCP DO OBEY Lowery d/c Vancomycin PO  2. Advance to full liquid diet  3.  CBC in AM  Loreen Kayser, APN  3:13 PM  12/28/2017  [facility]  [phone]    Attending physician addendum:   I personally saw and examined the patient, agree with the above findings,

## 2017-12-28 NOTE — CM/SW NOTE
12/28/17 1100   CM/SW Screening   Referral Source    Information Source Chart review;Nursing rounds  (patient and wife)   Patient's Mental Status Alert;Oriented   Patient's [address]   Patient lives with Spouse   Patient Status

## 2017-12-28 NOTE — PROGRESS NOTES
[facility]  Progress Note     Janeth Mukherjee Patient Status:  Inpatient    1935 MRN [id number]   [facility] 4NW-A Attending Rory Ibarra MD   Hosp Day # 0 PCP Analisa Friday, DO     Chief Complaint: Diarrhea    S: Patient wit Daily   • Rosuvastatin Calcium  5 mg Oral Nightly   • Alfuzosin HCl ER  10 mg Oral Daily with breakfast   • Heparin Sodium (Porcine)  5,000 Units Subcutaneous 2 times per day   • vancomycin  250 mg Oral Q6H       ASSESSMENT / PLAN:     1. C.  Diff colitis:

## 2017-12-29 LAB
BUN BLD-MCNC: 14 MG/DL (ref 8–20)
CALCIUM BLD-MCNC: 8.4 MG/DL (ref 8.3–10.3)
CHLORIDE: 109 MMOL/L (ref 101–111)
CO2: 23 MMOL/L (ref 22–32)
CREAT BLD-MCNC: 0.75 MG/DL (ref 0.7–1.3)
GLUCOSE BLD-MCNC: 95 MG/DL (ref 70–99)
PLATELET # BLD AUTO: 179 10(3)UL (ref 150–450)
POTASSIUM SERPL-SCNC: 3 MMOL/L (ref 3.6–5.1)
POTASSIUM SERPL-SCNC: 3 MMOL/L (ref 3.6–5.1)
POTASSIUM SERPL-SCNC: 3.6 MMOL/L (ref 3.6–5.1)
SODIUM SERPL-SCNC: 141 MMOL/L (ref 136–144)

## 2017-12-29 PROCEDURE — 99232 SBSQ HOSP IP/OBS MODERATE 35: CPT | Performed by: HOSPITALIST

## 2017-12-29 RX ORDER — POTASSIUM CHLORIDE 20 MEQ/1
40 TABLET, EXTENDED RELEASE ORAL EVERY 4 HOURS
Status: COMPLETED | OUTPATIENT
Start: 2017-12-29 | End: 2017-12-29

## 2017-12-29 RX ORDER — POTASSIUM CHLORIDE 20 MEQ/1
40 TABLET, EXTENDED RELEASE ORAL EVERY 4 HOURS
Status: COMPLETED | OUTPATIENT
Start: 2017-12-29 | End: 2017-12-30

## 2017-12-29 NOTE — CM/SW NOTE
SW received an order to check and see if Dificid is in stock at the outpatient pharmacy. SW called the outpatient pharmacy as was told it is not stocked.  The pharmacist does not know when they would get it in stock and would need a script to determine if i

## 2017-12-29 NOTE — PROGRESS NOTES
[facility]  Progress Note     María Alfaro Patient Status:  Inpatient    1935 MRN [id number]   [facility] 4NW-A Attending Sarah Nguyen MD   Hosp Day # 1 PCP Aleksandra Heard DO     Chief Complaint: Diarrhea    S: Patient wit 81 mg Oral Daily   • carvedilol  3.125 mg Oral BID with meals   • Donepezil HCl  10 mg Oral Nightly   • finasteride  5 mg Oral Daily   • lisinopril  2.5 mg Oral Daily   • Memantine HCl  10 mg Oral BID   • PARoxetine HCl  40 mg Oral Daily   • Rosuvastatin C

## 2017-12-29 NOTE — PLAN OF CARE
GASTROINTESTINAL - ADULT    • Maintains or returns to baseline bowel function Not Progressing        Impaired Cognition    • Patient will exhibit improved attention, thought processing and/or memory Not Progressing        NEUROLOGICAL - ADULT    • Achieves

## 2017-12-29 NOTE — PROGRESS NOTES
Gastroenterology Progress Note  Mei Moctezuma Patient Status:  Inpatient    1935 MRN [id number]   [facility] 4NW-A Attending Zora Sotomayor MD   [facility] Day # 1 PCP DO OBEY Bullard care: 26 minutes. We will sign off at this time.      Musa Quezada DO  1:41 PM  12/29/2017  [facility]  [phone]

## 2017-12-30 VITALS
SYSTOLIC BLOOD PRESSURE: 128 MMHG | TEMPERATURE: 99 F | HEART RATE: 65 BPM | BODY MASS INDEX: 20.61 KG/M2 | DIASTOLIC BLOOD PRESSURE: 85 MMHG | OXYGEN SATURATION: 97 % | RESPIRATION RATE: 20 BRPM | HEIGHT: 68 IN | WEIGHT: 136 LBS

## 2017-12-30 LAB — POTASSIUM SERPL-SCNC: 4.2 MMOL/L (ref 3.6–5.1)

## 2017-12-30 PROCEDURE — 99239 HOSP IP/OBS DSCHRG MGMT >30: CPT | Performed by: HOSPITALIST

## 2017-12-30 NOTE — CM/SW NOTE
YVONNE received call from unit RN asking about the medication order. Reviewed note from YVONNE yesterday that reads:      [address] [facility] which is noted to be the pt's pharmacy.  The pharmacist stated they had it in stock and stated he could do a \"test r

## 2017-12-30 NOTE — PROGRESS NOTES
[facility]  Progress Note     Celia Brunson Patient Status:  Inpatient    1935 MRN [id number]   [facility] 4NW-A Attending Sean Bah MD   [address] Day # 2 PCP Bill Calixto DO     Chief Complaint: Diarrhea    S: Patient wit 10 mg Oral BID   • PARoxetine HCl  40 mg Oral Daily   • Rosuvastatin Calcium  5 mg Oral Nightly   • Alfuzosin HCl ER  10 mg Oral Daily with breakfast   • Heparin Sodium (Porcine)  5,000 Units Subcutaneous 2 times per day       ASSESSMENT / PLAN:     1. C.

## 2017-12-30 NOTE — PROGRESS NOTES
ASSUMED CARE OF PT AT 2300    PROBLEM: Abdominal pain r/t C. Diff    ASSESSMENT: Pt is A&O x1. Very confused. Impulsive. Agitated at times this shift. Maintaining O2 sats >92% on RA. Lungs clear bilaterally. Tele NSR. No c/o n/v/d; tolerating full liquids.

## 2017-12-30 NOTE — PLAN OF CARE
Patient will exhibit improved attention, thought processing and/or memory Not Progressing      Achieves stable or improved neurological status Not Progressing      Maintains optimal cardiac output and hemodynamic stability Progressing      Maintains or ret

## 2017-12-30 NOTE — PLAN OF CARE
Assumed care @ 0730. Patient had 1 moderate loose stool this morning . Patient denies abdominal pain. Patient's vital signs stable.

## 2017-12-30 NOTE — PHYSICAL THERAPY NOTE
PHYSICAL THERAPY EVALUATION - INPATIENT     Room Number: 405/405-A  Evaluation Date: 12/30/2017  Type of Evaluation: Initial  Physician Order: PT Eval and Treat    Presenting Problem: s/p abdominal pain and diarrhea  Reason for Therapy: Mobility Dysfun Elsa Arriola; Location: [facility]  3/15/2016: RECONSTR NOSE+SANDRA SEPTAL REPAIR N/A      Comment: Procedure: SEPTORHINOPLASTY WITH OSTEOTOMIES                AND  GRAFTS;  Surgeon: Gaston Quiroga;                  Location: [facility] Turning over in bed (including adjusting bedclothes, sheets and blankets)?: None   -   Sitting down on and standing up from a chair with arms (e.g., wheelchair, bedside commode, etc.): None   -   Moving from lying on back to sitting on the side of the bed? Pertinent comorbidities and personal factors impacting therapy include dementia, recurrent c-diff.   In this PT evaluation, the patient presents with the following impairments inc impulsivity, dec endurance/activity tolerance, dec ability for stair negotiat

## 2017-12-31 NOTE — DISCHARGE SUMMARY
[facility]  DISCHARGE SUMMARY     Beti Kennedy Patient Status:  Inpatient    1935 MRN [id number]   [facility] 4NW-A Attending No att. providers found   Hosp Day # 2 PCP Sangtia Stephens DO     Date of Admission: 2017  Da findings and recommendations (brief descriptions):  • None    Lab/Test results pending at Discharge:   · None    Consultants:  • Dr. Monroe Morrison    Discharge Medication List:     Discharge Medications      START taking these medications      Instructions Presc - [address] AT [facility] [address], [phone], [address] [facility], [place] [address]    Hours:  24-hours Phone:  [phone]   · fidaxomicin 200 MG Tabs         Follow-up appointment:   Dona Mukherjee MD  [phone]

## 2018-01-02 ENCOUNTER — PATIENT MESSAGE (OUTPATIENT)
Dept: CASE MANAGEMENT | Age: 83
End: 2018-01-02

## 2018-01-02 ENCOUNTER — PATIENT OUTREACH (OUTPATIENT)
Dept: CASE MANAGEMENT | Age: 83
End: 2018-01-02

## 2018-01-02 NOTE — PROGRESS NOTES
LM for pt to call NCM back for condition update. [employer] message sent as well for condition update.

## 2018-01-11 PROBLEM — G30.1 LATE ONSET ALZHEIMER'S DISEASE WITHOUT BEHAVIORAL DISTURBANCE (HCC): Status: ACTIVE | Noted: 2018-01-11

## 2018-01-11 PROBLEM — F02.80 LATE ONSET ALZHEIMER'S DISEASE WITHOUT BEHAVIORAL DISTURBANCE (HCC): Status: ACTIVE | Noted: 2018-01-11

## 2018-01-11 NOTE — PATIENT INSTRUCTIONS
Refill policies:    • Allow 2-3 business days for refills; controlled substances may take longer.   • Contact your pharmacy at least 5 days prior to running out of medication and have them send an electronic request or submit request through the [facility] have a procedure or additional testing performed. [facility]) will contact your insurance carrier to obtain pre-certification or prior authorization.     Unfortunately, [employer] has seen an increase in denial of payment even though the p

## 2018-01-11 NOTE — PROGRESS NOTES
Pt here for follow up for memory loss. Pt overdue for 1 year follow up due to hospitalization for c-diff. Pts wife reports pt memory is \"not good. \"

## 2018-01-11 NOTE — PROGRESS NOTES
Amita 1827   Neurology; follow up  CLINIC VISIT  2018    Clearance Lis Patient Status:  No patient class for patient encounter    1935 MRN [id number]   Location [address] [place] [address] Elisha Evans DO     REASON SURGICAL HISTORY:  Past Surgical History:  3/1/00: CABG  12/1/00: CHOLECYSTECTOMY  No date: OTHER SURGICAL HISTORY      Comment: prostate surgery  3/15/2016: PATIENT DOCUMENTED NOT TO HAVE EXPERIENCED ANY* N/A      Comment: Procedure: SEPTORHINOPLASTY WITH daily. Disp:  Rfl:    lisinopril 2.5 MG Oral Tab Take 2.5 mg by mouth daily. Disp:  Rfl:    PARoxetine HCl 40 MG Oral Tab Take 40 mg by mouth every morning. Disp:  Rfl:    Rosuvastatin Calcium 5 MG Oral Tab Take 5 mg by mouth nightly.  Disp:  Rfl:    kdul lesions    Neurologic Examination:  Mental status: he is awake, alert, oriented to time, name and place, Mentally appropriate  for age;  Language and speech: language is intact in expression and comprehension, no dysarthria, voice is normal in volume, foll biceps, triceps, knees, ankles,  Babinski sign is negative;   Coordination: Normal FTN and HTS;   Gait: Normal based,  Romberg sign is negative, Tandem walk is normal      Problem List Items Addressed This Visit     Essential hypertension    [employer] opportunity to ask questions. All questions and concerns were addressed.      Mariama Bolton MD  General Neurology   Neuromuscular/ Electrodiagnostic Specialist  [facility]  1/11/2018

## 2018-01-16 ENCOUNTER — APPOINTMENT (OUTPATIENT)
Dept: GENERAL RADIOLOGY | Facility: HOSPITAL | Age: 83
End: 2018-01-16
Payer: MEDICARE

## 2018-01-16 ENCOUNTER — APPOINTMENT (OUTPATIENT)
Dept: CT IMAGING | Facility: HOSPITAL | Age: 83
End: 2018-01-16
Attending: EMERGENCY MEDICINE
Payer: MEDICARE

## 2018-01-16 ENCOUNTER — HOSPITAL ENCOUNTER (OUTPATIENT)
Facility: HOSPITAL | Age: 83
Setting detail: OBSERVATION
Discharge: HOME | End: 2018-01-17
Attending: EMERGENCY MEDICINE | Admitting: INTERNAL MEDICINE
Payer: MEDICARE

## 2018-01-16 DIAGNOSIS — R07.9 ACUTE CHEST PAIN: Primary | ICD-10-CM

## 2018-01-16 PROBLEM — E16.2 HYPOGLYCEMIA: Status: ACTIVE | Noted: 2018-01-16

## 2018-01-16 LAB
ALBUMIN SERPL-MCNC: 3.4 G/DL (ref 3.5–4.8)
ALP LIVER SERPL-CCNC: 41 U/L (ref 45–117)
ALT SERPL-CCNC: 27 U/L (ref 17–63)
APTT PPP: 28.9 SECONDS (ref 25–34)
AST SERPL-CCNC: 20 U/L (ref 15–41)
BASOPHILS # BLD AUTO: 0.09 X10(3) UL (ref 0–0.1)
BASOPHILS NFR BLD AUTO: 1.2 %
BILIRUB SERPL-MCNC: 0.7 MG/DL (ref 0.1–2)
BUN BLD-MCNC: 18 MG/DL (ref 8–20)
CALCIUM BLD-MCNC: 9.1 MG/DL (ref 8.3–10.3)
CHLORIDE: 107 MMOL/L (ref 101–111)
CO2: 28 MMOL/L (ref 22–32)
CREAT BLD-MCNC: 1.06 MG/DL (ref 0.7–1.3)
D-DIMER: 0.5 UG/ML FEU (ref 0–0.49)
EOSINOPHIL # BLD AUTO: 1.31 X10(3) UL (ref 0–0.3)
EOSINOPHIL NFR BLD AUTO: 17.6 %
ERYTHROCYTE [DISTWIDTH] IN BLOOD BY AUTOMATED COUNT: 12.6 % (ref 11.5–16)
GLUCOSE BLD-MCNC: 69 MG/DL (ref 70–99)
HCT VFR BLD AUTO: 36.8 % (ref 37–53)
HGB BLD-MCNC: 12.5 G/DL (ref 13–17)
IMMATURE GRANULOCYTE COUNT: 0.01 X10(3) UL (ref 0–1)
IMMATURE GRANULOCYTE RATIO %: 0.1 %
INR BLD: 1.08 (ref 0.89–1.11)
LYMPHOCYTES # BLD AUTO: 1.74 X10(3) UL (ref 0.9–4)
LYMPHOCYTES NFR BLD AUTO: 23.3 %
M PROTEIN MFR SERPL ELPH: 7.3 G/DL (ref 6.1–8.3)
MCH RBC QN AUTO: 32.4 PG (ref 27–33.2)
MCHC RBC AUTO-ENTMCNC: 34 G/DL (ref 31–37)
MCV RBC AUTO: 95.3 FL (ref 80–99)
MONOCYTES # BLD AUTO: 0.51 X10(3) UL (ref 0.1–0.6)
MONOCYTES NFR BLD AUTO: 6.8 %
NEUTROPHIL ABS PRELIM: 3.8 X10 (3) UL (ref 1.3–6.7)
NEUTROPHILS # BLD AUTO: 3.8 X10(3) UL (ref 1.3–6.7)
NEUTROPHILS NFR BLD AUTO: 51 %
PLATELET # BLD AUTO: 237 10(3)UL (ref 150–450)
POTASSIUM SERPL-SCNC: 3.7 MMOL/L (ref 3.6–5.1)
PSA SERPL DL<=0.01 NG/ML-MCNC: 14 SECONDS (ref 12–14.3)
RBC # BLD AUTO: 3.86 X10(6)UL (ref 3.8–5.8)
RED CELL DISTRIBUTION WIDTH-SD: 43.9 FL (ref 35.1–46.3)
SODIUM SERPL-SCNC: 142 MMOL/L (ref 136–144)
TROPONIN: <0.046 NG/ML (ref ?–0.05)
TROPONIN: <0.046 NG/ML (ref ?–0.05)
WBC # BLD AUTO: 7.5 X10(3) UL (ref 4–13)

## 2018-01-16 PROCEDURE — 71045 X-RAY EXAM CHEST 1 VIEW: CPT | Performed by: EMERGENCY MEDICINE

## 2018-01-16 PROCEDURE — 71275 CT ANGIOGRAPHY CHEST: CPT | Performed by: EMERGENCY MEDICINE

## 2018-01-16 PROCEDURE — 99220 INITIAL OBSERVATION CARE,LEVL III: CPT | Performed by: INTERNAL MEDICINE

## 2018-01-16 RX ORDER — DONEPEZIL HYDROCHLORIDE 10 MG/1
20 TABLET, FILM COATED ORAL NIGHTLY
Status: DISCONTINUED | OUTPATIENT
Start: 2018-01-16 | End: 2018-01-17

## 2018-01-16 RX ORDER — CARVEDILOL 3.12 MG/1
3.12 TABLET ORAL 2 TIMES DAILY WITH MEALS
Status: DISCONTINUED | OUTPATIENT
Start: 2018-01-16 | End: 2018-01-17

## 2018-01-16 RX ORDER — ACETAMINOPHEN 325 MG/1
650 TABLET ORAL EVERY 6 HOURS PRN
Status: DISCONTINUED | OUTPATIENT
Start: 2018-01-16 | End: 2018-01-17

## 2018-01-16 RX ORDER — ASPIRIN 325 MG
325 TABLET ORAL DAILY
Status: DISCONTINUED | OUTPATIENT
Start: 2018-01-17 | End: 2018-01-17

## 2018-01-16 RX ORDER — PAROXETINE HYDROCHLORIDE 20 MG/1
40 TABLET, FILM COATED ORAL EVERY MORNING
Status: DISCONTINUED | OUTPATIENT
Start: 2018-01-17 | End: 2018-01-17

## 2018-01-16 RX ORDER — FINASTERIDE 5 MG/1
5 TABLET, FILM COATED ORAL DAILY
Status: DISCONTINUED | OUTPATIENT
Start: 2018-01-17 | End: 2018-01-17

## 2018-01-16 RX ORDER — NITROGLYCERIN 0.4 MG/1
0.4 TABLET SUBLINGUAL EVERY 5 MIN PRN
Status: DISCONTINUED | OUTPATIENT
Start: 2018-01-16 | End: 2018-01-17

## 2018-01-16 RX ORDER — ALFUZOSIN HYDROCHLORIDE 10 MG/1
10 TABLET, EXTENDED RELEASE ORAL
Status: DISCONTINUED | OUTPATIENT
Start: 2018-01-17 | End: 2018-01-17

## 2018-01-16 RX ORDER — LISINOPRIL 2.5 MG/1
2.5 TABLET ORAL DAILY
Status: DISCONTINUED | OUTPATIENT
Start: 2018-01-17 | End: 2018-01-17

## 2018-01-16 RX ORDER — ASPIRIN 81 MG/1
324 TABLET, CHEWABLE ORAL ONCE
Status: COMPLETED | OUTPATIENT
Start: 2018-01-16 | End: 2018-01-16

## 2018-01-16 RX ORDER — ROSUVASTATIN CALCIUM 5 MG/1
5 TABLET, COATED ORAL NIGHTLY
Status: DISCONTINUED | OUTPATIENT
Start: 2018-01-16 | End: 2018-01-17

## 2018-01-16 RX ORDER — MEMANTINE HYDROCHLORIDE 10 MG/1
10 TABLET ORAL 2 TIMES DAILY
Status: DISCONTINUED | OUTPATIENT
Start: 2018-01-16 | End: 2018-01-17

## 2018-01-16 RX ORDER — HEPARIN SODIUM 5000 [USP'U]/ML
5000 INJECTION, SOLUTION INTRAVENOUS; SUBCUTANEOUS EVERY 8 HOURS SCHEDULED
Status: DISCONTINUED | OUTPATIENT
Start: 2018-01-16 | End: 2018-01-17

## 2018-01-16 RX ORDER — ONDANSETRON 2 MG/ML
4 INJECTION INTRAMUSCULAR; INTRAVENOUS EVERY 6 HOURS PRN
Status: DISCONTINUED | OUTPATIENT
Start: 2018-01-16 | End: 2018-01-17

## 2018-01-16 NOTE — ED INITIAL ASSESSMENT (HPI)
Patient presents with intermittent left upper chest pain. Patient denies cough or shortness of breath.

## 2018-01-16 NOTE — H&P
[facility]  History and Physical     Stephen Marlon Patient Status:  Emergency    1935 MRN [id number]   Location [address] Attending Fred Izaguirre MD   [address] Day # 0 PCP Andrzej Diaz DO     Chief Compl Procedure: SEPTORHINOPLASTY WITH OSTEOTOMIES                AND  GRAFTS;  Surgeon: Aki Draper;                  Location: [facility]  No date: REMOVAL GALLBLADDER  3/15/2016: REMV CARTILAGE FOR GRAFT NASAL N/A      Comment: Procedure: Odalys Felt SILVER OR) Take 1 tablet by mouth daily. Disp:  Rfl:        Review of Systems:   A comprehensive 14 point review of systems was completed. Pertinent positives and negatives noted in the HPI.     Physical Exam:    /92   Pulse 69   Temp (!) 97.5 °F meds  4. Dyslipidemia  5. Dementia  6. Pulm Nodule  1.  Will need repeat scan in 1 year      Quality:  · DVT Prophylaxis: HSQ  · CODE status: Full  · Sanchez: none    Plan of care discussed with patient, ED Physician    Anita Asif MD  1/16/2018

## 2018-01-16 NOTE — ED NOTES
Agree with triage note, pt reports L sided CP, has been intermittent in last few days, today will not go away. Denies any cough or SOB, denies any N/V, light headedness or swelling. A&A. VSS. Pt has hx of MI and cardiac bipass.

## 2018-01-16 NOTE — ED PROVIDER NOTES
Patient Seen in: [facility] Emergency Department    History   Patient presents with:  Chest Pain Angina (cardiovascular)    Stated Complaint: chest pain    HPI    Patient presents with chest pain.   The patient states that he started to have pain in RECONSTR NOSE+SANDRA SEPTAL REPAIR N/A      Comment: Procedure: SEPTORHINOPLASTY WITH OSTEOTOMIES                AND  GRAFTS;  Surgeon: Ambrocio Looney;                  Location: [facility]  No date: REMOVAL GALLBLADDER  3/15/2016: [address] [place] [address] Alkaline Phosphatase 41 (*)     Albumin 3.4 (*)     All other components within normal limits   D-DIMER - Abnormal; Notable for the following:     D-Dimer 0.50 (*)     All other components within normal limits    Narrative:     FEU = Fibrinogen Equivalent CT ABDOMEN PELVIS IV CONTRAST, NO ORAL (ER), 12/27/2017, 11:15. [place], CT BRAIN OR HEAD (86070), 12/22/2016, 16:13. [place], CT ABDOMEN+PELVIS(CONTRAST ONLY)(CPT=74177), 11/05/2016, 14:17.   INDICATIONS:  chest pain  TECHNIQUE:  IV contrast-enhanc 16:32.  INDICATIONS:  chest pain  PATIENT STATED HISTORY: (As transcribed by Technologist)  Patient states he had 2 episodes of left anterior chest pain. Patient has had stent and open heart OR and history of a heart attack.     FINDINGS:  Cardiac silhouet

## 2018-01-17 ENCOUNTER — APPOINTMENT (OUTPATIENT)
Dept: CV DIAGNOSTICS | Facility: HOSPITAL | Age: 83
End: 2018-01-17
Attending: INTERNAL MEDICINE
Payer: MEDICARE

## 2018-01-17 VITALS
WEIGHT: 140 LBS | SYSTOLIC BLOOD PRESSURE: 123 MMHG | HEIGHT: 69 IN | RESPIRATION RATE: 20 BRPM | DIASTOLIC BLOOD PRESSURE: 77 MMHG | HEART RATE: 93 BPM | TEMPERATURE: 98 F | OXYGEN SATURATION: 99 % | BODY MASS INDEX: 20.73 KG/M2

## 2018-01-17 LAB
BUN BLD-MCNC: 20 MG/DL (ref 8–20)
CALCIUM BLD-MCNC: 8.8 MG/DL (ref 8.3–10.3)
CHLORIDE: 108 MMOL/L (ref 101–111)
CHOLEST SMN-MCNC: 167 MG/DL (ref ?–200)
CO2: 24 MMOL/L (ref 22–32)
CREAT BLD-MCNC: 0.98 MG/DL (ref 0.7–1.3)
EST. AVERAGE GLUCOSE BLD GHB EST-MCNC: 105 MG/DL (ref 68–126)
GLUCOSE BLD-MCNC: 90 MG/DL (ref 70–99)
HBA1C MFR BLD HPLC: 5.3 % (ref ?–5.7)
HDLC SERPL-MCNC: 62 MG/DL (ref 45–?)
HDLC SERPL: 2.69 {RATIO} (ref ?–4.97)
LDLC SERPL CALC-MCNC: 88 MG/DL (ref ?–130)
NONHDLC SERPL-MCNC: 105 MG/DL (ref ?–130)
POTASSIUM SERPL-SCNC: 3.5 MMOL/L (ref 3.6–5.1)
POTASSIUM SERPL-SCNC: 4.2 MMOL/L (ref 3.6–5.1)
SODIUM SERPL-SCNC: 142 MMOL/L (ref 136–144)
TRIGL SERPL-MCNC: 86 MG/DL (ref ?–150)
TROPONIN: <0.046 NG/ML (ref ?–0.05)
VLDLC SERPL CALC-MCNC: 17 MG/DL (ref 5–40)

## 2018-01-17 PROCEDURE — 93018 CV STRESS TEST I&R ONLY: CPT | Performed by: INTERNAL MEDICINE

## 2018-01-17 PROCEDURE — 93306 TTE W/DOPPLER COMPLETE: CPT | Performed by: INTERNAL MEDICINE

## 2018-01-17 PROCEDURE — 99217 OBSERVATION CARE DISCHARGE: CPT | Performed by: HOSPITALIST

## 2018-01-17 PROCEDURE — 78452 HT MUSCLE IMAGE SPECT MULT: CPT | Performed by: INTERNAL MEDICINE

## 2018-01-17 PROCEDURE — 93017 CV STRESS TEST TRACING ONLY: CPT | Performed by: INTERNAL MEDICINE

## 2018-01-17 RX ORDER — POTASSIUM CHLORIDE 20 MEQ/1
40 TABLET, EXTENDED RELEASE ORAL EVERY 4 HOURS
Status: COMPLETED | OUTPATIENT
Start: 2018-01-17 | End: 2018-01-17

## 2018-01-17 NOTE — PROGRESS NOTES
Cardiology follow-up. Patient's nuclear stress test is negative for ischemia. Patient's echo shows mild LV dysfunction which has been seen in the past.  Patient's chest pain was one of 3 days of atypical chest discomfort.   EKG and cardiac enzymes were ne

## 2018-01-17 NOTE — ED NOTES
Report called to Iris Cordoba RN on floor. Pt to be transported on cart to room 3622. Pt stable a time of transfer.

## 2018-01-17 NOTE — CONSULTS
[facility]  Report of Consultation    Lettynori Moctezuma Patient Status:  Observation    1935 MRN [id number]   [facility] 3NE-A Attending Alie Norwood DO   Hosp Day # 0 PCP Reema Rodriguez DO     Reason for Consultation:  Chest pain HISTORY      Comment: prostate surgery  3/15/2016: PATIENT DOCUMENTED NOT TO HAVE EXPERIENCED ANY* N/A      Comment: Procedure: SEPTORHINOPLASTY WITH OSTEOTOMIES                AND  GRAFTS;  Surgeon: Katerine Grullon;                  Location: [facility] mg, Oral, Nightly  •  finasteride (PROSCAR) tab 5 mg, 5 mg, Oral, Daily  •  lisinopril (PRINIVIL,ZESTRIL) tab 2.5 mg, 2.5 mg, Oral, Daily  •  Memantine HCl (NAMENDA) tab 10 mg, 10 mg, Oral, BID  •  PARoxetine HCl (PAXIL) tab 40 mg, 40 mg, Oral, QAM  •  Ros atelectasis and/or scarring.            Labs:     Recent Labs   01/16/18  1341   WBC 7.5   HGB 12.5*   MCV 95.3   .0   INR 1.08       Recent Labs   01/16/18  1341 01/17/18  0543    142   K 3.7 3.5*    108   CO2 28.0 24.0   BUN 18 20   CRE

## 2018-01-17 NOTE — DISCHARGE SUMMARY
[facility] [place] HOSPITALIST  DISCHARGE SUMMARY     Clearance Lis Patient Status:  Observation    1935 MRN [id number]   [facility] 3NE-A Attending No att. providers found   Hosp Day # 0 PCP Elisha Evans DO     Date of Admission: 2018  D Prescription details   aspirin 81 MG Tabs      Take 1 tablet by mouth daily. Quantity:  90 tablet  Refills:  3     carvedilol 3.125 MG Tabs  Commonly known as:  COREG      Take 3.125 mg by mouth 2 (two) times daily with meals.    Refills:  0     CENTRUM S

## 2018-01-17 NOTE — CM/SW NOTE
01/17/18 0900   CM/SW Referral Data   Referral Source Social Work (self-referral)   Reason for Referral Discharge planning   Informant Patient   Pertinent Medical Hx   Primary Care Physician Name [id number]   Patient Info   Patient's Mental Status Alert;

## 2018-01-17 NOTE — PROGRESS NOTES
CARDIACDIAGNOSTICS NOTE      Inpatient ordered for Lexiscan stress test by Dr. Camilo Davis, [facility]. Call up to floor, spoke to primary RN Joyce.  Informed RN that any amount of caffeine would invalidate test and we must be 637% certain that pt

## 2018-01-17 NOTE — PROGRESS NOTES
[facility]  Progress Note     Debi Rowe Patient Status:  Observation    1935 MRN [id number]   [facility] 3NE-A Attending Cody Somers, [address] [facility] Day # 0 PCP Connie Fears, DO     Chief Complaint: chest pain    S: Patient Memantine HCl  10 mg Oral BID   • PARoxetine HCl  40 mg Oral QAM   • Rosuvastatin Calcium  5 mg Oral Nightly   • Alfuzosin HCl ER  10 mg Oral Daily with breakfast   • aspirin  325 mg Oral Daily   • Heparin Sodium (Porcine)  5,000 Units Subcutaneous [facility]

## 2018-01-17 NOTE — PROGRESS NOTES
NURSING ADMISSION NOTE      Patient admitted via Cart  Oriented to room. Safety precautions initiated. Bed in low position. Call light in reach. A/Ox2- forgetful/confused, needs constant reorientation.  Pt is unsure of where he is or how he got he

## 2018-01-18 ENCOUNTER — HOSPITAL ENCOUNTER (EMERGENCY)
Facility: HOSPITAL | Age: 83
Discharge: HOME | End: 2018-01-18
Attending: EMERGENCY MEDICINE
Payer: MEDICARE

## 2018-01-18 ENCOUNTER — PATIENT OUTREACH (OUTPATIENT)
Dept: CASE MANAGEMENT | Age: 83
End: 2018-01-18

## 2018-01-18 VITALS
OXYGEN SATURATION: 97 % | WEIGHT: 140 LBS | TEMPERATURE: 98 F | HEIGHT: 69 IN | SYSTOLIC BLOOD PRESSURE: 129 MMHG | DIASTOLIC BLOOD PRESSURE: 69 MMHG | BODY MASS INDEX: 20.73 KG/M2 | RESPIRATION RATE: 25 BRPM | HEART RATE: 73 BPM

## 2018-01-18 DIAGNOSIS — R07.9 ACUTE CHEST PAIN: ICD-10-CM

## 2018-01-18 DIAGNOSIS — A04.72 CLOSTRIDIUM DIFFICILE COLITIS: Primary | ICD-10-CM

## 2018-01-18 DIAGNOSIS — R19.7 DIARRHEA OF PRESUMED INFECTIOUS ORIGIN: ICD-10-CM

## 2018-01-18 LAB
ALBUMIN SERPL-MCNC: 3.3 G/DL (ref 3.5–4.8)
ALP LIVER SERPL-CCNC: 45 U/L (ref 45–117)
ALT SERPL-CCNC: 22 U/L (ref 17–63)
AST SERPL-CCNC: 13 U/L (ref 15–41)
BASOPHILS # BLD AUTO: 0.06 X10(3) UL (ref 0–0.1)
BASOPHILS NFR BLD AUTO: 0.6 %
BILIRUB SERPL-MCNC: 1 MG/DL (ref 0.1–2)
BILIRUB UR QL STRIP.AUTO: NEGATIVE
BUN BLD-MCNC: 29 MG/DL (ref 8–20)
CALCIUM BLD-MCNC: 9.1 MG/DL (ref 8.3–10.3)
CHLORIDE: 109 MMOL/L (ref 101–111)
CLARITY UR REFRACT.AUTO: CLEAR
CO2: 27 MMOL/L (ref 22–32)
CREAT BLD-MCNC: 1.04 MG/DL (ref 0.7–1.3)
EOSINOPHIL # BLD AUTO: 0.15 X10(3) UL (ref 0–0.3)
EOSINOPHIL NFR BLD AUTO: 1.5 %
ERYTHROCYTE [DISTWIDTH] IN BLOOD BY AUTOMATED COUNT: 12.8 % (ref 11.5–16)
GLUCOSE BLD-MCNC: 99 MG/DL (ref 70–99)
GLUCOSE UR STRIP.AUTO-MCNC: NEGATIVE MG/DL
HCT VFR BLD AUTO: 35.9 % (ref 37–53)
HGB BLD-MCNC: 12.3 G/DL (ref 13–17)
IMMATURE GRANULOCYTE COUNT: 0.02 X10(3) UL (ref 0–1)
IMMATURE GRANULOCYTE RATIO %: 0.2 %
KETONES UR STRIP.AUTO-MCNC: NEGATIVE MG/DL
LYMPHOCYTES # BLD AUTO: 1.11 X10(3) UL (ref 0.9–4)
LYMPHOCYTES NFR BLD AUTO: 11.4 %
M PROTEIN MFR SERPL ELPH: 7.4 G/DL (ref 6.1–8.3)
MCH RBC QN AUTO: 32.5 PG (ref 27–33.2)
MCHC RBC AUTO-ENTMCNC: 34.3 G/DL (ref 31–37)
MCV RBC AUTO: 95 FL (ref 80–99)
MONOCYTES # BLD AUTO: 1.18 X10(3) UL (ref 0.1–0.6)
MONOCYTES NFR BLD AUTO: 12.1 %
NEUTROPHIL ABS PRELIM: 7.24 X10 (3) UL (ref 1.3–6.7)
NEUTROPHILS # BLD AUTO: 7.24 X10(3) UL (ref 1.3–6.7)
NEUTROPHILS NFR BLD AUTO: 74.2 %
NITRITE UR QL STRIP.AUTO: NEGATIVE
PH UR STRIP.AUTO: 5 [PH] (ref 4.5–8)
PLATELET # BLD AUTO: 208 10(3)UL (ref 150–450)
POTASSIUM SERPL-SCNC: 4.3 MMOL/L (ref 3.6–5.1)
PROT UR STRIP.AUTO-MCNC: NEGATIVE MG/DL
RBC # BLD AUTO: 3.78 X10(6)UL (ref 3.8–5.8)
RED CELL DISTRIBUTION WIDTH-SD: 44.5 FL (ref 35.1–46.3)
SODIUM SERPL-SCNC: 141 MMOL/L (ref 136–144)
SP GR UR STRIP.AUTO: 1.03 (ref 1–1.03)
UROBILINOGEN UR STRIP.AUTO-MCNC: <2 MG/DL
WBC # BLD AUTO: 9.8 X10(3) UL (ref 4–13)

## 2018-01-18 PROCEDURE — 85025 COMPLETE CBC W/AUTO DIFF WBC: CPT | Performed by: EMERGENCY MEDICINE

## 2018-01-18 PROCEDURE — 80053 COMPREHEN METABOLIC PANEL: CPT | Performed by: EMERGENCY MEDICINE

## 2018-01-18 PROCEDURE — 87086 URINE CULTURE/COLONY COUNT: CPT | Performed by: EMERGENCY MEDICINE

## 2018-01-18 PROCEDURE — 99284 EMERGENCY DEPT VISIT MOD MDM: CPT

## 2018-01-18 PROCEDURE — 81001 URINALYSIS AUTO W/SCOPE: CPT | Performed by: EMERGENCY MEDICINE

## 2018-01-18 PROCEDURE — 96360 HYDRATION IV INFUSION INIT: CPT

## 2018-01-18 NOTE — CM/SW NOTE
Pt d/c 01/17 home with no d/c needs.        01/18/18 0800   Discharge disposition   Discharged to: Home or [address] [place] [address] services after discharge None   Discharge transportation Private car

## 2018-01-18 NOTE — PLAN OF CARE
NURSING DISCHARGE NOTE    Discharged Home via Wheelchair. Accompanied by Support staff  Belongings Taken by patient/family     Discharge papers given and explained to spouse.   Patient took all belongings

## 2018-01-19 ENCOUNTER — HOSPITAL ENCOUNTER (EMERGENCY)
Facility: HOSPITAL | Age: 83
Discharge: HOME | End: 2018-01-19
Attending: EMERGENCY MEDICINE
Payer: MEDICARE

## 2018-01-19 ENCOUNTER — TELEPHONE (OUTPATIENT)
Dept: FAMILY MEDICINE CLINIC | Facility: CLINIC | Age: 83
End: 2018-01-19

## 2018-01-19 VITALS
DIASTOLIC BLOOD PRESSURE: 82 MMHG | RESPIRATION RATE: 20 BRPM | WEIGHT: 140 LBS | HEART RATE: 67 BPM | SYSTOLIC BLOOD PRESSURE: 154 MMHG | OXYGEN SATURATION: 100 % | HEIGHT: 69 IN | BODY MASS INDEX: 20.73 KG/M2 | TEMPERATURE: 98 F

## 2018-01-19 DIAGNOSIS — A04.72 CLOSTRIDIUM DIFFICILE COLITIS: Primary | ICD-10-CM

## 2018-01-19 LAB
ATRIAL RATE: 69 BPM
BASOPHILS # BLD AUTO: 0.04 X10(3) UL (ref 0–0.1)
BASOPHILS NFR BLD AUTO: 0.7 %
BUN BLD-MCNC: 23 MG/DL (ref 8–20)
CALCIUM BLD-MCNC: 8.9 MG/DL (ref 8.3–10.3)
CHLORIDE: 109 MMOL/L (ref 101–111)
CO2: 28 MMOL/L (ref 22–32)
CREAT BLD-MCNC: 0.99 MG/DL (ref 0.7–1.3)
EOSINOPHIL # BLD AUTO: 0.41 X10(3) UL (ref 0–0.3)
EOSINOPHIL NFR BLD AUTO: 7 %
ERYTHROCYTE [DISTWIDTH] IN BLOOD BY AUTOMATED COUNT: 12.6 % (ref 11.5–16)
GLUCOSE BLD-MCNC: 102 MG/DL (ref 70–99)
HCT VFR BLD AUTO: 36.4 % (ref 37–53)
HGB BLD-MCNC: 12.3 G/DL (ref 13–17)
IMMATURE GRANULOCYTE COUNT: 0.02 X10(3) UL (ref 0–1)
IMMATURE GRANULOCYTE RATIO %: 0.3 %
LYMPHOCYTES # BLD AUTO: 1.32 X10(3) UL (ref 0.9–4)
LYMPHOCYTES NFR BLD AUTO: 22.5 %
MCH RBC QN AUTO: 32.1 PG (ref 27–33.2)
MCHC RBC AUTO-ENTMCNC: 33.8 G/DL (ref 31–37)
MCV RBC AUTO: 95 FL (ref 80–99)
MONOCYTES # BLD AUTO: 0.92 X10(3) UL (ref 0.1–0.6)
MONOCYTES NFR BLD AUTO: 15.7 %
NEUTROPHIL ABS PRELIM: 3.16 X10 (3) UL (ref 1.3–6.7)
NEUTROPHILS # BLD AUTO: 3.16 X10(3) UL (ref 1.3–6.7)
NEUTROPHILS NFR BLD AUTO: 53.8 %
P AXIS: 71 DEGREES
P-R INTERVAL: 152 MS
PLATELET # BLD AUTO: 207 10(3)UL (ref 150–450)
POTASSIUM SERPL-SCNC: 4 MMOL/L (ref 3.6–5.1)
Q-T INTERVAL: 422 MS
QRS DURATION: 96 MS
QTC CALCULATION (BEZET): 452 MS
R AXIS: 21 DEGREES
RBC # BLD AUTO: 3.83 X10(6)UL (ref 3.8–5.8)
RED CELL DISTRIBUTION WIDTH-SD: 43.7 FL (ref 35.1–46.3)
SODIUM SERPL-SCNC: 141 MMOL/L (ref 136–144)
T AXIS: 45 DEGREES
VENTRICULAR RATE: 69 BPM
WBC # BLD AUTO: 5.9 X10(3) UL (ref 4–13)

## 2018-01-19 PROCEDURE — 96360 HYDRATION IV INFUSION INIT: CPT

## 2018-01-19 PROCEDURE — 87427 SHIGA-LIKE TOXIN AG IA: CPT | Performed by: EMERGENCY MEDICINE

## 2018-01-19 PROCEDURE — 87493 C DIFF AMPLIFIED PROBE: CPT | Performed by: EMERGENCY MEDICINE

## 2018-01-19 PROCEDURE — 87077 CULTURE AEROBIC IDENTIFY: CPT | Performed by: EMERGENCY MEDICINE

## 2018-01-19 PROCEDURE — 87046 STOOL CULTR AEROBIC BACT EA: CPT | Performed by: EMERGENCY MEDICINE

## 2018-01-19 PROCEDURE — 99284 EMERGENCY DEPT VISIT MOD MDM: CPT

## 2018-01-19 PROCEDURE — 87045 FECES CULTURE AEROBIC BACT: CPT | Performed by: EMERGENCY MEDICINE

## 2018-01-19 PROCEDURE — 85025 COMPLETE CBC W/AUTO DIFF WBC: CPT | Performed by: EMERGENCY MEDICINE

## 2018-01-19 PROCEDURE — 87493 C DIFF AMPLIFIED PROBE: CPT

## 2018-01-19 PROCEDURE — 80048 BASIC METABOLIC PNL TOTAL CA: CPT | Performed by: EMERGENCY MEDICINE

## 2018-01-19 PROCEDURE — 82272 OCCULT BLD FECES 1-3 TESTS: CPT

## 2018-01-19 PROCEDURE — 82272 OCCULT BLD FECES 1-3 TESTS: CPT | Performed by: EMERGENCY MEDICINE

## 2018-01-19 RX ORDER — VANCOMYCIN HYDROCHLORIDE 250 MG/1
250 CAPSULE ORAL 4 TIMES DAILY
Qty: 40 CAPSULE | Refills: 0 | Status: SHIPPED | OUTPATIENT
Start: 2018-01-19 | End: 2018-02-01 | Stop reason: ALTCHOICE

## 2018-01-19 NOTE — ED INITIAL ASSESSMENT (HPI)
PT c/o diarrhea and weakness. Pt seen in ER last night for possible dx of C-diff, however, Pt unable to have BM in ER for diagnosis.  PT does have hx of C-diff

## 2018-01-19 NOTE — ED PROVIDER NOTES
Patient Seen in: [facility] Emergency Department    History   Patient presents with:  Abdomen/Flank Pain (GI/)    Stated Complaint: diarrhea    HPI    27-year-old male who comes emerged from with diarrhea.   He was seen in the emergency room by [facility] Location: [facility]  3/15/2016: RECONSTR NOSE+SANDRA SEPTAL REPAIR N/A      Comment: Procedure: SEPTORHINOPLASTY WITH OSTEOTOMIES                AND  GRAFTS;  Surgeon: Juan Sanderson;                  Location: [facility]  [facility] - [facility]; Notable for the following:     HGB 12.3 (*)     HCT 36.4 (*)     All other components within normal limits   CBC WITH DIFFERENTIAL WITH PLATELET    Narrative:      The following orders were created for panel order CBC WITH DIFFERENT appointment as soon as possible for a visit in 1 week          Medications Prescribed:  Current Discharge Medication List    START taking these medications    Vancomycin HCl 250 MG Oral Cap  Take 1 capsule (250 mg total) by mouth 4 (four) times daily.   Erika Sour

## 2018-01-19 NOTE — ED NOTES
Pt stable, bilateral side rails elevated, call light in reach, wife and daughter at bedside, vitals stable, Pt breathing w/o difficulty, A&Ox4 & skin parameters in tact

## 2018-01-19 NOTE — TELEPHONE ENCOUNTER
Patient discharge home from [facility] on 1/17/18, Patient back in ER on 1/18/18 with diarrhea thought to be C-diff, patient is at High risk for readmission and recommended to have a TCM HFU by 1/24/18.  NCM attempted to schedule TCM HFU appointment,

## 2018-01-19 NOTE — ED PROVIDER NOTES
Patient Seen in: [facility] Emergency Department    History   Patient presents with:  Abdomen/Flank Pain (GI/)    Stated Complaint: Virginia Malvin RAMIREZ    61-year-old male recently discharged from the hospital.  He has been fighting C. difficile enter SEPTORHINOPLASTY WITH OSTEOTOMIES                AND  GRAFTS;  Surgeon: Fiorella Serrano;                  Location: [facility]  No date: REMOVAL GALLBLADDER  3/15/2016: REMV CARTILAGE FOR GRAFT NASAL N/A      Comment: Procedure: SEPTORHINOPLASTY WIT URINALYSIS WITH CULTURE REFLEX - Abnormal; Notable for the following:     Urine Color Claudia (*)     Blood Urine Small (*)     Leukocyte Esterase Urine Small (*)     WBC Urine 5-10 (*)     Ca Oxalate Crystals Rare (*)     Mucous Urine 3+ (*)     All other

## 2018-01-19 NOTE — PROGRESS NOTES
Initial Post Discharge Follow Up   Discharge Date: 1/18/18  Contact Date: 1/18/2018    Consent Verification:  Assessment Completed With: Spouse: Jamie Patelcheco received per patient?  written  HIPAA Verified?   Yes    Discharge Dx:    1/17/18 Patient di every morning. Disp:  Rfl:    Rosuvastatin Calcium 5 MG Oral Tab Take 5 mg by mouth nightly. Disp:  Rfl:    tamsulosin HCl 0.4 MG Oral Cap Take 0.4 mg by mouth daily. Disp:  Rfl:    aspirin 81 MG Oral Tab Take 1 tablet by mouth daily.  Disp: 90 tablet Rfl: appointment with pt:  NCM attempted to schedule TCM HFU appointment, patient's wife Matt Munguia wants to wait until after Alysa Tam is seen by GI and Cardiology on 1/23/18. Message sent to MD's office.       Have you made all of your follow up appointments? no    Is

## 2018-01-20 NOTE — CM/SW NOTE
Spoke with wife about +c-diff results. Reviewed care management. Pt will be taking antibiotics as prescribed.

## 2018-02-01 ENCOUNTER — LAB ENCOUNTER (OUTPATIENT)
Dept: LAB | Age: 83
End: 2018-02-01
Attending: INTERNAL MEDICINE
Payer: MEDICARE

## 2018-02-01 DIAGNOSIS — A04.72 CLOSTRIDIUM DIFFICILE DIARRHEA: ICD-10-CM

## 2018-02-01 LAB
HAV IGM SER QL: NONREACTIVE
HBV CORE IGM SER QL: NONREACTIVE
HBV SURFACE AG SERPL QL IA: NONREACTIVE
HEPATITIS C VIRUS AB INTERPRETATION: NONREACTIVE
T PALLIDUM AB SER QL IA: NONREACTIVE

## 2018-02-01 PROCEDURE — 86701 HIV-1ANTIBODY: CPT

## 2018-02-01 PROCEDURE — 87536 HIV-1 QUANT&REVRSE TRNSCRPJ: CPT

## 2018-02-01 PROCEDURE — 36415 COLL VENOUS BLD VENIPUNCTURE: CPT

## 2018-02-01 PROCEDURE — 86702 HIV-2 ANTIBODY: CPT

## 2018-02-01 PROCEDURE — 80074 ACUTE HEPATITIS PANEL: CPT

## 2018-02-01 PROCEDURE — 86780 TREPONEMA PALLIDUM: CPT

## 2018-02-01 RX ORDER — VANCOMYCIN HYDROCHLORIDE 250 MG/1
250 CAPSULE ORAL 2 TIMES DAILY
COMMUNITY
End: 2018-02-08

## 2018-02-07 LAB
HIV-1 ANTIBODY: NEGATIVE
HIV-1 QNT BY NAAT (COPIES/ML): <20 CPY/ML
HIV-1 QNT BY NAAT (LOG COPIES/ML): <1.3 LOG
HIV-1 QNT BY NAAT INTERP: NOT DETECTED
HIV-2 ANTIBODY: NEGATIVE

## 2018-02-08 ENCOUNTER — SURGERY (OUTPATIENT)
Age: 83
End: 2018-02-08

## 2018-02-08 ENCOUNTER — HOSPITAL ENCOUNTER (OUTPATIENT)
Facility: HOSPITAL | Age: 83
Setting detail: HOSPITAL OUTPATIENT SURGERY
Discharge: HOME | End: 2018-02-08
Attending: INTERNAL MEDICINE | Admitting: INTERNAL MEDICINE
Payer: MEDICARE

## 2018-02-08 ENCOUNTER — ANESTHESIA EVENT (OUTPATIENT)
Dept: ENDOSCOPY | Facility: HOSPITAL | Age: 83
End: 2018-02-08
Payer: MEDICARE

## 2018-02-08 ENCOUNTER — ANESTHESIA (OUTPATIENT)
Dept: ENDOSCOPY | Facility: HOSPITAL | Age: 83
End: 2018-02-08
Payer: MEDICARE

## 2018-02-08 VITALS
SYSTOLIC BLOOD PRESSURE: 165 MMHG | DIASTOLIC BLOOD PRESSURE: 73 MMHG | TEMPERATURE: 98 F | HEART RATE: 62 BPM | BODY MASS INDEX: 23.7 KG/M2 | OXYGEN SATURATION: 100 % | HEIGHT: 69 IN | RESPIRATION RATE: 16 BRPM | WEIGHT: 160 LBS

## 2018-02-08 DIAGNOSIS — A04.72 CLOSTRIDIUM DIFFICILE DIARRHEA: ICD-10-CM

## 2018-02-08 PROCEDURE — 0DBE8ZX EXCISION OF LARGE INTESTINE, VIA NATURAL OR ARTIFICIAL OPENING ENDOSCOPIC, DIAGNOSTIC: ICD-10-PCS | Performed by: INTERNAL MEDICINE

## 2018-02-08 PROCEDURE — 3E0H8GC INTRODUCTION OF OTHER THERAPEUTIC SUBSTANCE INTO LOWER GI, VIA NATURAL OR ARTIFICIAL OPENING ENDOSCOPIC: ICD-10-PCS | Performed by: INTERNAL MEDICINE

## 2018-02-08 PROCEDURE — 0DBP8ZX EXCISION OF RECTUM, VIA NATURAL OR ARTIFICIAL OPENING ENDOSCOPIC, DIAGNOSTIC: ICD-10-PCS | Performed by: INTERNAL MEDICINE

## 2018-02-08 PROCEDURE — 88305 TISSUE EXAM BY PATHOLOGIST: CPT | Performed by: INTERNAL MEDICINE

## 2018-02-08 DEVICE — FECAL MICROBIOTA 250ML: Type: IMPLANTABLE DEVICE | Site: COLON | Status: FUNCTIONAL

## 2018-02-08 RX ORDER — SODIUM CHLORIDE, SODIUM LACTATE, POTASSIUM CHLORIDE, CALCIUM CHLORIDE 600; 310; 30; 20 MG/100ML; MG/100ML; MG/100ML; MG/100ML
INJECTION, SOLUTION INTRAVENOUS CONTINUOUS
Status: DISCONTINUED | OUTPATIENT
Start: 2018-02-08 | End: 2018-02-08

## 2018-02-08 RX ORDER — NALOXONE HYDROCHLORIDE 0.4 MG/ML
80 INJECTION, SOLUTION INTRAMUSCULAR; INTRAVENOUS; SUBCUTANEOUS AS NEEDED
Status: DISCONTINUED | OUTPATIENT
Start: 2018-02-08 | End: 2018-02-08

## 2018-02-08 NOTE — ANESTHESIA POSTPROCEDURE EVALUATION
[address] [facility] Patient Status:  Hospital Outpatient Surgery   Age/Gender [de-identified]year old male MRN [id number]   Location [facility]. Attending Mabeline Spurling, MD   Hosp Day # 0 PCP Dom Adair DO       Anesthesia Post-op No

## 2018-02-08 NOTE — OPERATIVE REPORT
Fecal Microbiota Transplant Operative Report  Patient Name: Phyllis Bryant  Procedure: Colonoscopy with Fecal Microbiota Transplant  Indication: Recurrent C.difficile infection  Attending: Zoe Johnson M.D.   Consent: The risks, benefits, and alternati was intubated and found to be normal to the extent examined. The endoscope was then carefully withdrawn from the patient with careful visualization of the colonic mucosa revealing no additional pathologic findings.   Minimal air was suctioned from the left

## 2018-02-08 NOTE — ANESTHESIA PREPROCEDURE EVALUATION
PRE-OP EVALUATION    Patient Name: Charity Carroll    Pre-op Diagnosis: Clostridium difficile diarrhea [A04.72]    Procedure(s):  COLONOSCOPY WITH FECAL MICROBIOTA TRANSPLANT    Surgeon(s) and Role:     * Sarah Izquierdo MD - Primary    Pre-op vitals revie CABG  12/1/00: CHOLECYSTECTOMY  No date: OTHER SURGICAL HISTORY      Comment: prostate surgery  3/15/2016: PATIENT DOCUMENTED NOT TO HAVE EXPERIENCED ANY* N/A      Comment: Procedure: SEPTORHINOPLASTY WITH OSTEOTOMIES                AND  GRAFTS;  S normal.         Dental    No notable dental history.          Pulmonary    Pulmonary exam normal.                 Other findings            ASA: 3   Plan: MAC  NPO status verified and     Post-procedure pain management plan discussed with surgeon and patien

## 2018-02-09 NOTE — H&P
[address]Third Floor Patient Status:  Hospital Outpatient Surgery    1935 MRN [id number]   [facility] ENDOSCOPY Attending No att. providers found   Hosp Day # 0 PCP Aleksey Leak, DO     History of OSTEOTOMIES                AND  GRAFTS;  Surgeon: Fiorella Serrano;                  Location: [facility]  No date: REMOVAL GALLBLADDER  3/15/2016: REMV CARTILAGE FOR GRAFT NASAL N/A      Comment: Procedure: SEPTORHINOPLASTY WITH OSTEOTOMIES Systems:  Gastrointestinal: Denies positive test for blood stool, heartburn/indigestion/reflux, belching, difficulty swallowing, irregular bowel habits, painful swallowing, diarrhea, abdominal pain, constipation, nausea, incontinence of stool, vomiting, bl excessive bleeding, enlarging or painful lymph nodes. Allergy: Denies medication allergy, latex/rubber allergy, anaphylactic or other reaction to anesthesia, food allergy. Eyes: Denies blurred/double vision, eye disease, glasses or contacts, glaucoma.

## 2018-02-15 NOTE — PROGRESS NOTES
Here with loving wife. His mid stage dementia of the Alzheimer's type continues to slowly progress. He seems to have responded favorably although slightly with the use of donepezil and Namenda. He is also been suffering with diarrhea and abdominal pain.

## 2018-03-17 RX ORDER — ROSUVASTATIN CALCIUM 5 MG/1
TABLET, COATED ORAL
Qty: 90 TABLET | Refills: 0 | Status: SHIPPED | OUTPATIENT
Start: 2018-03-17 | End: 2018-04-02

## 2018-03-19 RX ORDER — TAMSULOSIN HYDROCHLORIDE 0.4 MG/1
CAPSULE ORAL
Qty: 90 CAPSULE | Refills: 0 | Status: SHIPPED | OUTPATIENT
Start: 2018-03-19 | End: 2018-06-16

## 2018-03-19 RX ORDER — PAROXETINE HYDROCHLORIDE 40 MG/1
TABLET, FILM COATED ORAL
Qty: 90 TABLET | Refills: 0 | Status: SHIPPED | OUTPATIENT
Start: 2018-03-19 | End: 2018-06-16

## 2018-03-19 RX ORDER — FINASTERIDE 5 MG/1
TABLET, FILM COATED ORAL
Qty: 90 TABLET | Refills: 0 | Status: SHIPPED | OUTPATIENT
Start: 2018-03-19 | End: 2018-06-16

## 2018-03-21 DIAGNOSIS — F02.80 LATE ONSET ALZHEIMER'S DISEASE WITHOUT BEHAVIORAL DISTURBANCE (HCC): ICD-10-CM

## 2018-03-21 DIAGNOSIS — G30.1 LATE ONSET ALZHEIMER'S DISEASE WITHOUT BEHAVIORAL DISTURBANCE (HCC): ICD-10-CM

## 2018-03-21 DIAGNOSIS — G30.8 ALZHEIMER'S DISEASE OF OTHER ONSET WITHOUT BEHAVIORAL DISTURBANCE: ICD-10-CM

## 2018-03-21 DIAGNOSIS — F02.80 ALZHEIMER'S DISEASE OF OTHER ONSET WITHOUT BEHAVIORAL DISTURBANCE: ICD-10-CM

## 2018-03-21 RX ORDER — MEMANTINE HYDROCHLORIDE 10 MG/1
TABLET ORAL
Qty: 180 TABLET | Refills: 2 | Status: SHIPPED | OUTPATIENT
Start: 2018-03-21 | End: 2018-07-31

## 2018-03-21 NOTE — TELEPHONE ENCOUNTER
Medication: Memantine 10mg     Date of last refill: 1/11/18  [place]     Last office visit: 1/11/18  Due back to clinic per last office note:  1 year  Date next office visit scheduled:  No appointment     Last OV note re

## 2018-04-02 RX ORDER — ROSUVASTATIN CALCIUM 5 MG/1
TABLET, COATED ORAL
Qty: 90 TABLET | Refills: 0 | Status: SHIPPED | OUTPATIENT
Start: 2018-04-02 | End: 2018-10-20

## 2018-04-03 ENCOUNTER — TELEPHONE (OUTPATIENT)
Dept: FAMILY MEDICINE CLINIC | Facility: CLINIC | Age: 83
End: 2018-04-03

## 2018-04-03 NOTE — TELEPHONE ENCOUNTER
Pt will be d/c'd from [facility] - nurse needs 2 more visits - pls call with verbal ok

## 2018-04-04 PROBLEM — I42.0 DCM (DILATED CARDIOMYOPATHY) (HCC): Status: ACTIVE | Noted: 2018-04-04

## 2018-04-20 ENCOUNTER — APPOINTMENT (OUTPATIENT)
Dept: LAB | Age: 83
End: 2018-04-20
Attending: OTOLARYNGOLOGY
Payer: MEDICARE

## 2018-04-20 DIAGNOSIS — I10 ESSENTIAL HYPERTENSION, BENIGN: ICD-10-CM

## 2018-04-20 PROCEDURE — 36415 COLL VENOUS BLD VENIPUNCTURE: CPT

## 2018-04-20 PROCEDURE — 80053 COMPREHEN METABOLIC PANEL: CPT

## 2018-04-20 PROCEDURE — 93005 ELECTROCARDIOGRAM TRACING: CPT

## 2018-04-20 PROCEDURE — 93010 ELECTROCARDIOGRAM REPORT: CPT | Performed by: INTERNAL MEDICINE

## 2018-04-25 ENCOUNTER — OFFICE VISIT (OUTPATIENT)
Dept: FAMILY MEDICINE CLINIC | Facility: CLINIC | Age: 83
End: 2018-04-25

## 2018-04-25 VITALS
BODY MASS INDEX: 22.28 KG/M2 | RESPIRATION RATE: 16 BRPM | OXYGEN SATURATION: 98 % | DIASTOLIC BLOOD PRESSURE: 78 MMHG | TEMPERATURE: 97 F | WEIGHT: 147 LBS | HEART RATE: 82 BPM | HEIGHT: 68 IN | SYSTOLIC BLOOD PRESSURE: 136 MMHG

## 2018-04-25 DIAGNOSIS — T50.905A ADVERSE EFFECT OF DRUG, INITIAL ENCOUNTER: Primary | ICD-10-CM

## 2018-04-25 PROCEDURE — 99213 OFFICE O/P EST LOW 20 MIN: CPT | Performed by: FAMILY MEDICINE

## 2018-04-25 NOTE — PROGRESS NOTES
Here with wife. Still having occasional nonlocalized abdominal pain. He is currently pain-free and today's exam vital signs normal anicteric well-hydrated in no distress.   Lungs are clear heart tones normal and bowel sounds unremarkable the abdomen has n

## 2018-06-18 ENCOUNTER — TELEPHONE (OUTPATIENT)
Dept: FAMILY MEDICINE CLINIC | Facility: CLINIC | Age: 83
End: 2018-06-18

## 2018-06-18 RX ORDER — TAMSULOSIN HYDROCHLORIDE 0.4 MG/1
CAPSULE ORAL
Qty: 90 CAPSULE | Refills: 0 | Status: SHIPPED | OUTPATIENT
Start: 2018-06-18 | End: 2018-09-13

## 2018-06-18 RX ORDER — PAROXETINE HYDROCHLORIDE 40 MG/1
TABLET, FILM COATED ORAL
Qty: 90 TABLET | Refills: 0 | Status: SHIPPED | OUTPATIENT
Start: 2018-06-18 | End: 2018-06-21

## 2018-06-18 RX ORDER — FINASTERIDE 5 MG/1
TABLET, FILM COATED ORAL
Qty: 90 TABLET | Refills: 0 | Status: SHIPPED | OUTPATIENT
Start: 2018-06-18 | End: 2018-09-16

## 2018-06-18 NOTE — TELEPHONE ENCOUNTER
Last ov was 4/25/18  Last refill tamsulion 3/19/18  Last refill finasteride 3/19/18  Last refill paroxetine  3/19/18

## 2018-06-18 NOTE — TELEPHONE ENCOUNTER
Per wife, pt was told to f/u this week for his dementia and anxiety. Pt is stable currently.   appt made thursday

## 2018-06-18 NOTE — TELEPHONE ENCOUNTER
Patient was told to call and follow up this week. No appts available to sched. Please give PT a call back to discuss options.

## 2018-06-19 PROBLEM — A04.72 CLOSTRIDIUM DIFFICILE COLITIS: Status: RESOLVED | Noted: 2017-10-11 | Resolved: 2018-06-19

## 2018-06-19 PROBLEM — R19.7 DIARRHEA OF PRESUMED INFECTIOUS ORIGIN: Status: RESOLVED | Noted: 2017-12-27 | Resolved: 2018-06-19

## 2018-06-20 ENCOUNTER — APPOINTMENT (OUTPATIENT)
Dept: LAB | Age: 83
End: 2018-06-20
Attending: INTERNAL MEDICINE
Payer: MEDICARE

## 2018-06-20 DIAGNOSIS — K31.9 DYSPEPSIA AND DISORDER OF FUNCTION OF STOMACH: ICD-10-CM

## 2018-06-20 DIAGNOSIS — R10.84 GENERALIZED ABDOMINAL PAIN: ICD-10-CM

## 2018-06-20 DIAGNOSIS — R10.13 DYSPEPSIA AND DISORDER OF FUNCTION OF STOMACH: ICD-10-CM

## 2018-06-20 PROCEDURE — 87338 HPYLORI STOOL AG IA: CPT

## 2018-06-20 PROCEDURE — 83993 ASSAY FOR CALPROTECTIN FECAL: CPT

## 2018-06-21 NOTE — PROGRESS NOTES
Patient is here because family is concerned and beginning to be overwhelmed by his hyperactivity. It is usually focused and not agitated. He does not roam at night he does not try to elope from the house.   Done is suffering from slow evolving dementia of

## 2018-06-29 ENCOUNTER — APPOINTMENT (OUTPATIENT)
Dept: LAB | Age: 83
End: 2018-06-29
Attending: INTERNAL MEDICINE
Payer: MEDICARE

## 2018-06-29 DIAGNOSIS — K51.00 PANCOLITIS (HCC): ICD-10-CM

## 2018-06-29 DIAGNOSIS — R19.7 DIARRHEA, UNSPECIFIED TYPE: ICD-10-CM

## 2018-06-29 PROCEDURE — 83993 ASSAY FOR CALPROTECTIN FECAL: CPT

## 2018-06-29 PROCEDURE — 87493 C DIFF AMPLIFIED PROBE: CPT

## 2018-07-02 LAB — CALPROTECTIN, FECAL: 128 UG/G

## 2018-08-21 ENCOUNTER — APPOINTMENT (OUTPATIENT)
Dept: LAB | Age: 83
End: 2018-08-21
Attending: FAMILY MEDICINE
Payer: MEDICARE

## 2018-08-21 DIAGNOSIS — K51.00 PANCOLITIS (HCC): ICD-10-CM

## 2018-08-21 PROCEDURE — 83993 ASSAY FOR CALPROTECTIN FECAL: CPT

## 2018-08-23 LAB — CALPROTECTIN, FECAL: 172 UG/G

## 2018-09-01 ENCOUNTER — APPOINTMENT (OUTPATIENT)
Dept: LAB | Age: 83
End: 2018-09-01
Attending: NURSE PRACTITIONER
Payer: MEDICARE

## 2018-09-01 DIAGNOSIS — R19.7 DIARRHEA, UNSPECIFIED TYPE: ICD-10-CM

## 2018-09-01 PROCEDURE — 87493 C DIFF AMPLIFIED PROBE: CPT

## 2018-09-13 RX ORDER — TAMSULOSIN HYDROCHLORIDE 0.4 MG/1
CAPSULE ORAL
Qty: 90 CAPSULE | Refills: 0 | Status: SHIPPED | OUTPATIENT
Start: 2018-09-13 | End: 2018-12-21

## 2018-09-17 RX ORDER — FINASTERIDE 5 MG/1
TABLET, FILM COATED ORAL
Qty: 90 TABLET | Refills: 0 | Status: SHIPPED | OUTPATIENT
Start: 2018-09-17 | End: 2018-12-21

## 2018-09-17 RX ORDER — PAROXETINE HYDROCHLORIDE 40 MG/1
TABLET, FILM COATED ORAL
Qty: 90 TABLET | Refills: 0 | OUTPATIENT
Start: 2018-09-17

## 2018-09-25 RX ORDER — MIRTAZAPINE 15 MG/1
15 TABLET, FILM COATED ORAL NIGHTLY
Qty: 60 TABLET | Refills: 1 | Status: SHIPPED | OUTPATIENT
Start: 2018-09-25 | End: 2019-01-23

## 2018-10-12 NOTE — PROGRESS NOTES
Here for follow-up of he has anxiety. He is now back on Paxil and serving him very well. Today's exam he is certainly animated as usual but he is not hallucinating he is not delusional he is alert aware appropriate. Speech is unremarkable.   His insight

## 2018-10-14 ENCOUNTER — HOSPITAL ENCOUNTER (OUTPATIENT)
Age: 83
Discharge: HOME | End: 2018-10-14
Payer: MEDICARE

## 2018-10-14 ENCOUNTER — APPOINTMENT (OUTPATIENT)
Dept: GENERAL RADIOLOGY | Age: 83
End: 2018-10-14
Attending: PHYSICIAN ASSISTANT
Payer: MEDICARE

## 2018-10-14 VITALS
WEIGHT: 155 LBS | TEMPERATURE: 98 F | OXYGEN SATURATION: 97 % | BODY MASS INDEX: 22.96 KG/M2 | DIASTOLIC BLOOD PRESSURE: 65 MMHG | HEIGHT: 69 IN | HEART RATE: 87 BPM | SYSTOLIC BLOOD PRESSURE: 125 MMHG | RESPIRATION RATE: 18 BRPM

## 2018-10-14 DIAGNOSIS — M54.2 CERVICALGIA: Primary | ICD-10-CM

## 2018-10-14 PROCEDURE — 99213 OFFICE O/P EST LOW 20 MIN: CPT

## 2018-10-14 PROCEDURE — 99214 OFFICE O/P EST MOD 30 MIN: CPT

## 2018-10-14 PROCEDURE — 72050 X-RAY EXAM NECK SPINE 4/5VWS: CPT | Performed by: PHYSICIAN ASSISTANT

## 2018-10-14 RX ORDER — TIZANIDINE 2 MG/1
2 TABLET ORAL NIGHTLY
Qty: 6 TABLET | Refills: 0 | Status: ON HOLD | OUTPATIENT
Start: 2018-10-14 | End: 2018-10-20

## 2018-10-14 NOTE — ED PROVIDER NOTES
Patient Seen in: [facility] END    History   Patient presents with:  Neck Pain    Stated Complaint: neck pain    HPI    Abdiel Leonardo is an 29-year-old male who comes in today with his wife with complaints of neck stiffness and mild muscul Cintia Case; Location: [facility]   • PATIENT WITH PREOPERATIVE ORDER FOR IV ANTIBIOTIC SURGICAL SITE INFECT N/A 3/15/2016    Procedure: SEPTORHINOPLASTY WITH OSTEOTOMIES AND  GRAFTS;  Surgeon: John Gallardo;   Location: [facility]   • RECONSTR NOS atraumatic. Neck: Normal range of motion. Cardiovascular: Normal rate, regular rhythm, normal heart sounds and intact distal pulses. Pulmonary/Chest: Effort normal and breath sounds normal. No respiratory distress. He has no wheezes. He has no rales. Negra    I discussed with the patient and his wife that I do believe this is musculoskeletal in nature as he has been trying anti-inflammatories with minimal relief we will try him on a very low dose muscle relaxant I have warned with the wife that if h

## 2018-10-14 NOTE — ED INITIAL ASSESSMENT (HPI)
C/o neck pain both sides feeling stiff for couple weeks and this morning much worse, unable to turn head. Denies known injury. Taking Monster Aspirin no resolution.

## 2018-10-17 ENCOUNTER — APPOINTMENT (OUTPATIENT)
Dept: CT IMAGING | Facility: HOSPITAL | Age: 83
End: 2018-10-17
Attending: EMERGENCY MEDICINE
Payer: MEDICARE

## 2018-10-17 ENCOUNTER — HOSPITAL ENCOUNTER (EMERGENCY)
Facility: HOSPITAL | Age: 83
Discharge: HOME | End: 2018-10-17
Attending: EMERGENCY MEDICINE
Payer: MEDICARE

## 2018-10-17 VITALS
SYSTOLIC BLOOD PRESSURE: 124 MMHG | TEMPERATURE: 98 F | WEIGHT: 155 LBS | OXYGEN SATURATION: 98 % | DIASTOLIC BLOOD PRESSURE: 88 MMHG | HEIGHT: 69 IN | HEART RATE: 88 BPM | RESPIRATION RATE: 18 BRPM | BODY MASS INDEX: 22.96 KG/M2

## 2018-10-17 DIAGNOSIS — M54.2 NECK PAIN: Primary | ICD-10-CM

## 2018-10-17 DIAGNOSIS — M47.812 CERVICAL ARTHRITIS: ICD-10-CM

## 2018-10-17 PROCEDURE — 85025 COMPLETE CBC W/AUTO DIFF WBC: CPT | Performed by: EMERGENCY MEDICINE

## 2018-10-17 PROCEDURE — 96374 THER/PROPH/DIAG INJ IV PUSH: CPT

## 2018-10-17 PROCEDURE — 70498 CT ANGIOGRAPHY NECK: CPT | Performed by: EMERGENCY MEDICINE

## 2018-10-17 PROCEDURE — 99284 EMERGENCY DEPT VISIT MOD MDM: CPT

## 2018-10-17 PROCEDURE — 80048 BASIC METABOLIC PNL TOTAL CA: CPT | Performed by: EMERGENCY MEDICINE

## 2018-10-17 PROCEDURE — 70496 CT ANGIOGRAPHY HEAD: CPT | Performed by: EMERGENCY MEDICINE

## 2018-10-17 RX ORDER — TRAMADOL HYDROCHLORIDE 50 MG/1
50 TABLET ORAL EVERY 8 HOURS PRN
Qty: 10 TABLET | Refills: 0 | Status: ON HOLD | OUTPATIENT
Start: 2018-10-17 | End: 2018-10-20

## 2018-10-17 RX ORDER — MORPHINE SULFATE 4 MG/ML
1 INJECTION, SOLUTION INTRAMUSCULAR; INTRAVENOUS ONCE
Status: COMPLETED | OUTPATIENT
Start: 2018-10-17 | End: 2018-10-17

## 2018-10-17 NOTE — ED PROVIDER NOTES
Patient Seen in: [facility] Emergency Department    History   Patient presents with:  Neck Pain (musculoskeletal, neurologic)    Stated Complaint: neck pain    HPI    The patient is a 82-year-old male who presents emergency room with a history of bi of other diseases of circulatory system    • Prostate cancer [facility])    • Sciatica    • Stented coronary artery    • Visual impairment     glasses   • Wears glasses        Past Surgical History:   Procedure Laterality Date   • CABG  3/1/00   • CHOLECYSTECTOM negative except as noted above.     Physical Exam     ED Triage Vitals [10/17/18 1003]   BP (!) 172/94   Pulse 88   Resp 18   Temp 98 °F (36.7 °C)   Temp src Temporal   SpO2 99 %   O2 Device None (Room air)       Current:/88   Pulse 88   Temp 98 °F (3 (8) - Abnormal; Notable for the following components:       Result Value    Glucose 131 (*)     BUN 21 (*)     BUN/CREA Ratio 21.4 (*)     All other components within normal limits   CBC W/ DIFFERENTIAL - Abnormal; Notable for the following components: patient was given IV morphine for pain here in the emergency room. Patient had some improvement after this was given.   Repeat examination showed the patient had continued tenderness to palpation over the lateral aspect of the neck only on both sides with

## 2018-10-17 NOTE — ED INITIAL ASSESSMENT (HPI)
Patient was seen at his Doctors office on Sunday for neck pain. X ray was negative only showed arthritis. Gave him 6 muscle relaxer's low dose patient has a history of Dementia.    Presents with increase pain

## 2018-10-19 ENCOUNTER — OFFICE VISIT (OUTPATIENT)
Dept: FAMILY MEDICINE CLINIC | Facility: CLINIC | Age: 83
End: 2018-10-19
Payer: MEDICARE

## 2018-10-19 ENCOUNTER — HOSPITAL ENCOUNTER (OUTPATIENT)
Facility: HOSPITAL | Age: 83
Setting detail: OBSERVATION
Discharge: ANOTHER HOSPITAL | End: 2018-10-20
Admitting: INTERNAL MEDICINE
Payer: MEDICARE

## 2018-10-19 VITALS
HEART RATE: 74 BPM | DIASTOLIC BLOOD PRESSURE: 78 MMHG | TEMPERATURE: 97 F | SYSTOLIC BLOOD PRESSURE: 118 MMHG | RESPIRATION RATE: 18 BRPM | OXYGEN SATURATION: 98 %

## 2018-10-19 DIAGNOSIS — M54.12 CERVICAL RADICULOPATHY: ICD-10-CM

## 2018-10-19 DIAGNOSIS — M54.12 CERVICAL RADICULOPATHY: Primary | ICD-10-CM

## 2018-10-19 DIAGNOSIS — M54.2 ACUTE NECK PAIN: Primary | ICD-10-CM

## 2018-10-19 DIAGNOSIS — R26.89 DECREASED MOBILITY: ICD-10-CM

## 2018-10-19 PROCEDURE — 99214 OFFICE O/P EST MOD 30 MIN: CPT | Performed by: FAMILY MEDICINE

## 2018-10-19 RX ORDER — PREDNISONE 20 MG/1
60 TABLET ORAL DAILY
Qty: 15 TABLET | Refills: 0 | Status: ON HOLD | OUTPATIENT
Start: 2018-10-19 | End: 2018-10-20

## 2018-10-19 RX ORDER — MORPHINE SULFATE 4 MG/ML
2 INJECTION, SOLUTION INTRAMUSCULAR; INTRAVENOUS ONCE
Status: COMPLETED | OUTPATIENT
Start: 2018-10-19 | End: 2018-10-19

## 2018-10-19 RX ORDER — HYDROCODONE BITARTRATE AND ACETAMINOPHEN 5; 325 MG/1; MG/1
1 TABLET ORAL EVERY 6 HOURS PRN
Qty: 30 TABLET | Refills: 0 | Status: ON HOLD | OUTPATIENT
Start: 2018-10-19 | End: 2018-10-20

## 2018-10-19 NOTE — PROGRESS NOTES
HPI:   Mei Moctezuma is a 82year old male with h/o dementia here to follow up on neck pain     Pt woke up with pain on Sunday and wife took him to the ER   X-ray c/w degenerative changes  Pt c/o 10/10 pain and reports it radiates down   Pt has had decreas Diagnosis Date   • Acute bronchitis    • Acute sinusitis, unspecified    • Allergic rhinitis, cause unspecified    • Atherosclerosis of coronary artery    • Back pain    • Black stools    • C. difficile enteritis    • Contact dermatitis and other eczema, 3/15/2016    Performed by Juice Galan at [address] [place] [address]   • SINUS SURGERY       • TONSILLECTOMY        Family History   Problem Relation Age of Onset   • Heart Disease Mother    • Other (leukemia) Father    • Other (stroke syndrome) Sister    • Bleeding [place] intact    ASSESSMENT AND PLAN:   Serenity Fuller is a 82year old male who presents with     1. Cervical radiculopathy  DISCUSSED CAUTION WITH STEROID AND NORCO AT LENGTH   - MRI SPINE CERVICAL (CPT=72141); Future  - predniSONE 20 MG Oral Tab;  Take 3 tablets

## 2018-10-20 ENCOUNTER — APPOINTMENT (OUTPATIENT)
Dept: CT IMAGING | Facility: HOSPITAL | Age: 83
End: 2018-10-20
Payer: MEDICARE

## 2018-10-20 VITALS
DIASTOLIC BLOOD PRESSURE: 62 MMHG | RESPIRATION RATE: 18 BRPM | HEIGHT: 70 IN | OXYGEN SATURATION: 97 % | WEIGHT: 180 LBS | TEMPERATURE: 97 F | BODY MASS INDEX: 25.77 KG/M2 | SYSTOLIC BLOOD PRESSURE: 98 MMHG | HEART RATE: 75 BPM

## 2018-10-20 PROCEDURE — 70450 CT HEAD/BRAIN W/O DYE: CPT

## 2018-10-20 PROCEDURE — 99234 HOSP IP/OBS SM DT SF/LOW 45: CPT | Performed by: HOSPITALIST

## 2018-10-20 PROCEDURE — 72125 CT NECK SPINE W/O DYE: CPT

## 2018-10-20 RX ORDER — ASPIRIN 81 MG/1
81 TABLET, CHEWABLE ORAL DAILY
Status: DISCONTINUED | OUTPATIENT
Start: 2018-10-20 | End: 2018-10-20

## 2018-10-20 RX ORDER — TIZANIDINE 4 MG/1
4 TABLET ORAL EVERY 6 HOURS PRN
Status: DISCONTINUED | OUTPATIENT
Start: 2018-10-20 | End: 2018-10-20

## 2018-10-20 RX ORDER — TIZANIDINE 2 MG/1
2 TABLET ORAL NIGHTLY
Status: DISCONTINUED | OUTPATIENT
Start: 2018-10-20 | End: 2018-10-20

## 2018-10-20 RX ORDER — METOCLOPRAMIDE HYDROCHLORIDE 5 MG/ML
10 INJECTION INTRAMUSCULAR; INTRAVENOUS EVERY 8 HOURS PRN
Status: DISCONTINUED | OUTPATIENT
Start: 2018-10-20 | End: 2018-10-20

## 2018-10-20 RX ORDER — LISINOPRIL 2.5 MG/1
2.5 TABLET ORAL DAILY
Status: DISCONTINUED | OUTPATIENT
Start: 2018-10-20 | End: 2018-10-20

## 2018-10-20 RX ORDER — HYDROCODONE BITARTRATE AND ACETAMINOPHEN 5; 325 MG/1; MG/1
2 TABLET ORAL EVERY 4 HOURS PRN
Status: DISCONTINUED | OUTPATIENT
Start: 2018-10-20 | End: 2018-10-20

## 2018-10-20 RX ORDER — HYDROCODONE BITARTRATE AND ACETAMINOPHEN 5; 325 MG/1; MG/1
1 TABLET ORAL EVERY 4 HOURS PRN
Status: DISCONTINUED | OUTPATIENT
Start: 2018-10-20 | End: 2018-10-20

## 2018-10-20 RX ORDER — MORPHINE SULFATE 4 MG/ML
1 INJECTION, SOLUTION INTRAMUSCULAR; INTRAVENOUS
Status: DISCONTINUED | OUTPATIENT
Start: 2018-10-20 | End: 2018-10-20

## 2018-10-20 RX ORDER — ALFUZOSIN HYDROCHLORIDE 10 MG/1
10 TABLET, EXTENDED RELEASE ORAL
Status: DISCONTINUED | OUTPATIENT
Start: 2018-10-20 | End: 2018-10-20

## 2018-10-20 RX ORDER — GABAPENTIN 100 MG/1
100 CAPSULE ORAL NIGHTLY
Status: DISCONTINUED | OUTPATIENT
Start: 2018-10-20 | End: 2018-10-20

## 2018-10-20 RX ORDER — ACETAMINOPHEN 325 MG/1
650 TABLET ORAL EVERY 6 HOURS PRN
Status: DISCONTINUED | OUTPATIENT
Start: 2018-10-20 | End: 2018-10-20

## 2018-10-20 RX ORDER — MIRTAZAPINE 15 MG/1
15 TABLET, FILM COATED ORAL NIGHTLY
Status: DISCONTINUED | OUTPATIENT
Start: 2018-10-20 | End: 2018-10-20

## 2018-10-20 RX ORDER — FLUTICASONE PROPIONATE 50 MCG
1 SPRAY, SUSPENSION (ML) NASAL DAILY
Status: DISCONTINUED | OUTPATIENT
Start: 2018-10-20 | End: 2018-10-20

## 2018-10-20 RX ORDER — DONEPEZIL HYDROCHLORIDE 10 MG/1
20 TABLET, FILM COATED ORAL NIGHTLY
Status: DISCONTINUED | OUTPATIENT
Start: 2018-10-20 | End: 2018-10-20

## 2018-10-20 RX ORDER — CARVEDILOL 3.12 MG/1
3.12 TABLET ORAL 2 TIMES DAILY WITH MEALS
Status: DISCONTINUED | OUTPATIENT
Start: 2018-10-20 | End: 2018-10-20

## 2018-10-20 RX ORDER — HYDROCODONE BITARTRATE AND ACETAMINOPHEN 5; 325 MG/1; MG/1
1 TABLET ORAL EVERY 6 HOURS PRN
Qty: 20 TABLET | Refills: 0 | Status: SHIPPED | OUTPATIENT
Start: 2018-10-20 | End: 2018-10-27 | Stop reason: ALTCHOICE

## 2018-10-20 RX ORDER — ARIPIPRAZOLE 15 MG/1
40 TABLET ORAL EVERY 4 HOURS
Status: COMPLETED | OUTPATIENT
Start: 2018-10-20 | End: 2018-10-20

## 2018-10-20 RX ORDER — HYDROMORPHONE HYDROCHLORIDE 1 MG/ML
0.5 INJECTION, SOLUTION INTRAMUSCULAR; INTRAVENOUS; SUBCUTANEOUS EVERY 30 MIN PRN
Status: ACTIVE | OUTPATIENT
Start: 2018-10-20 | End: 2018-10-20

## 2018-10-20 RX ORDER — FINASTERIDE 5 MG/1
5 TABLET, FILM COATED ORAL
Status: DISCONTINUED | OUTPATIENT
Start: 2018-10-20 | End: 2018-10-20

## 2018-10-20 RX ORDER — MESALAMINE 400 MG/1
1600 CAPSULE, DELAYED RELEASE ORAL 3 TIMES DAILY
Status: DISCONTINUED | OUTPATIENT
Start: 2018-10-20 | End: 2018-10-20

## 2018-10-20 RX ORDER — SODIUM CHLORIDE 9 MG/ML
INJECTION, SOLUTION INTRAVENOUS CONTINUOUS
Status: ACTIVE | OUTPATIENT
Start: 2018-10-20 | End: 2018-10-20

## 2018-10-20 RX ORDER — TIZANIDINE 2 MG/1
2 TABLET ORAL NIGHTLY
Qty: 20 TABLET | Refills: 0 | Status: SHIPPED | OUTPATIENT
Start: 2018-10-20 | End: 2018-12-06 | Stop reason: ALTCHOICE

## 2018-10-20 RX ORDER — ONDANSETRON 2 MG/ML
4 INJECTION INTRAMUSCULAR; INTRAVENOUS EVERY 6 HOURS PRN
Status: DISCONTINUED | OUTPATIENT
Start: 2018-10-20 | End: 2018-10-20

## 2018-10-20 RX ORDER — ROSUVASTATIN CALCIUM 5 MG/1
5 TABLET, COATED ORAL NIGHTLY
Status: DISCONTINUED | OUTPATIENT
Start: 2018-10-20 | End: 2018-10-20

## 2018-10-20 RX ORDER — ONDANSETRON 2 MG/ML
4 INJECTION INTRAMUSCULAR; INTRAVENOUS EVERY 4 HOURS PRN
Status: DISCONTINUED | OUTPATIENT
Start: 2018-10-20 | End: 2018-10-20

## 2018-10-20 RX ORDER — HYDROCODONE BITARTRATE AND ACETAMINOPHEN 5; 325 MG/1; MG/1
1 TABLET ORAL EVERY 6 HOURS PRN
Status: DISCONTINUED | OUTPATIENT
Start: 2018-10-20 | End: 2018-10-20

## 2018-10-20 RX ORDER — PREDNISONE 20 MG/1
60 TABLET ORAL DAILY
Qty: 12 TABLET | Refills: 0 | Status: SHIPPED | OUTPATIENT
Start: 2018-10-20 | End: 2018-10-24

## 2018-10-20 RX ORDER — GABAPENTIN 100 MG/1
100 CAPSULE ORAL NIGHTLY
Qty: 30 CAPSULE | Refills: 0 | Status: SHIPPED | OUTPATIENT
Start: 2018-10-20 | End: 2018-11-08

## 2018-10-20 RX ORDER — MORPHINE SULFATE 4 MG/ML
2 INJECTION, SOLUTION INTRAMUSCULAR; INTRAVENOUS
Status: DISCONTINUED | OUTPATIENT
Start: 2018-10-20 | End: 2018-10-20

## 2018-10-20 NOTE — ED INITIAL ASSESSMENT (HPI)
Pt presents to ED via EMS from home with complaint of neck pain. Pt seen in ED several days ago for same thing, diagnosed with arthritis.  Reports seeing PCP today and given prescription for norco and prednisone and told to return to ED if no relief in pain

## 2018-10-20 NOTE — DISCHARGE SUMMARY
[facility] HOSPITALIST  DISCHARGE SUMMARY     Reina Berg Patient Status:  Observation    1935 MRN [id number]   [facility] 3SW-A Attending Irma Bain MD   Hosp Day # 0 YUNI Warren DO     Date of Admission: 10/19/2018  Date o prednisone. Prednisone was not started but he did take 1 norco which helped his pain. He was able to sleep but  around 4-5 hours later he started to have a HA which was a new symptom. He was brought to the ED by family.       No fevers/chills.   No n/v/a ARICEPT      Take 2 tablets (20 mg total) by mouth nightly.    Quantity:  180 tablet  Refills:  3     finasteride 5 MG Tabs  Commonly known as:  PROSCAR      TAKE 1 TABLET BY MOUTH EVERY DAY   Quantity:  90 tablet  Refills:  0     HYDRO[place]acetaminophen [place]    Follow-up appointment:   Fannie Mckeon DO  [address] [place] [address] [address]  [phone]    Schedule an appointment as soon as possible for a visit      Pain Management  [facility]   Pain Management  630

## 2018-10-20 NOTE — OCCUPATIONAL THERAPY NOTE
OCCUPATIONAL THERAPY EVALUATION - INPATIENT     Room Number: 352/352-A  Evaluation Date: 10/20/2018  Type of Evaluation: Initial  Presenting Problem: neck pain    Physician Order: IP Consult to Occupational Therapy  Reason for Therapy: ADL/IADL Dysfunction W/FECAL MICROBIOTA TRANSPLANT N/A 2/8/2018    Performed by Que Lu MD at [facility] [facility]   • OTHER SURGICAL HISTORY      prostate surgery   • PATIENT DOCUMENTED NOT TO HAVE EXPERIENCED ANY OF THE FOLLOWING EVENTS N/A 3/15/2016    Procedure: Camille Cabot commands with increased time      RANGE OF MOTION AND STRENGTH ASSESSMENT  Upper extremity ROM is within functional limits     Upper extremity strength is within functional limits     COORDINATION  Gross Motor    WFL    Fine Motor    WFL      ADDITIONAL TE Patient is a 82year old male admitted on 10/19/2018 for neck pain. Complete medical history and occupational profile noted above.  Functional outcome measures completed include Results of the AM-PAC \"6 clicks\" Inpatient Activities of Daily Living Sammi education  Rehab Potential : Fair     Number of Visits to Meet Established Goals: 2    ADL Goals   Patient will perform lower body dressing:  with supervision  Patient will perform toileting: with supervision    Functional Transfer Goals  Patient will perf

## 2018-10-20 NOTE — H&P
[facility]  History and Physical     Dacia Ryan Patient Status:  Emergency    1935 MRN [id number]   Location [address] Attending Gem Negron MD   Hosp Day # 0 PCP Roger Perdomo DO     Chief Complaint: Past Surgical History:   Past Surgical History:   Procedure Laterality Date   • CABG  3/1/00   • CHOLECYSTECTOMY  12/1/00   • COLONOSCOPY W/FECAL MICROBIOTA TRANSPLANT N/A 2/8/2018    Performed by Steve Herman MD at [address] [place] [address] ENDOSCOPY   • OTHER SURGICAL Encounter:  predniSONE 20 MG Oral Tab Take 3 tablets (60 mg total) by mouth daily for 5 days. Disp: 15 tablet Rfl: 0   HYDROcodone-acetaminophen (NORCO) 5-325 MG Oral Tab Take 1 tablet by mouth every 6 (six) hours as needed for Pain.  Disp: 30 tablet Rfl: 0 No acute distress. Alert and oriented x 3. HEENT: Normocephalic atraumatic. Moist mucous membranes. Mild tenderness to sinuses. EOM-I. PERRLA. Anicteric. Neck: No lymphadenopathy. No JVD. No carotid bruits.   Respiratory: Clear to auscultation bilaterall injection  9. Hold plavix  3. CAD s/p CABG  1. Hold plavix  2. Consider discussing with Dr. Dequan lopez to get ok to hold plavix though CABG was many years ago  3. Cont. ASA/BB/statin  4. HTN  5. DL  6. 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 UC  1.

## 2018-10-20 NOTE — ED PROVIDER NOTES
Patient Seen in: [facility] Emergency Department    History   Patient presents with:  Neck Pain (musculoskeletal, neurologic)    Stated Complaint: neck pain    HPI    Elderly male brought to the ER by ambulance, second time in 2 days, saw primary ca Performed by Iain Hutton MD at [facility] ENDOSCOPY   • OTHER SURGICAL HISTORY      prostate surgery   • PATIENT DOCUMENTED NOT TO HAVE EXPERIENCED ANY OF THE FOLLOWING EVENTS N/A 3/15/2016    Procedure: SEPTORHINOPLASTY WITH OSTEOTOMIES AND  GRAFTS; 100%   BMI 25.83 kg/m²         Physical Exam    GENERAL APPEARANCE:     Well developed, well nourished, alert       Head: Normocephalic and atraumatic.      Sclera anicteric, conjunctiva pink and moist.   PERRL, EOMI    Panic membranes occluded by cerumen b WITH DIFFERENTIAL WITH PLATELET.   Procedure                               Abnormality         Status                     ---------                               -----------         ------                     CBC W/ DIFFERENTIAL[[id number]]          Abnormal

## 2018-10-20 NOTE — PROGRESS NOTES
NURSING DISCHARGE NOTE    Discharged to Rehab facility via Ambulance. Accompanied by Spouse and daughter. Belongings Taken by patient/family. Report given to receiving nurse, Oz Trinh, at [facility].    D/C instructions reviewed with spouse, who voiced un

## 2018-10-20 NOTE — PHYSICAL THERAPY NOTE
PHYSICAL THERAPY EVALUATION - INPATIENT     Room Number: 352/352-A  Evaluation Date: 10/20/2018  Type of Evaluation: Initial  Physician Order: PT Eval and Treat    Presenting Problem: acute neck pain  Reason for Therapy: Mobility Dysfunction and Disc neural foraminal stenosis at C4-5. Moderate to severe bilateral neural foraminal stenosis at C5-6. Moderate bilateral neural foraminal stenosis at C6-7. Paraspinal soft tissues are unremarkable.   Multinodular thyroid goiter would be better assessed DOCUMENTED NOT TO HAVE EXPERIENCED ANY OF THE FOLLOWING EVENTS N/A 3/15/2016    Procedure: SEPTORHINOPLASTY WITH OSTEOTOMIES AND  GRAFTS;  Surgeon: Cindy Rosales;   Location: [facility]   • PATIENT WITH PREOPERATIVE ORDER FOR IV ANTIBIOTIC SURGICA decreased awareness of need for assistance and decreased awareness of need for safety    RANGE OF MOTION AND STRENGTH ASSESSMENT  Upper extremity ROM and strength: see OT note    Lower extremity ROM is within functional limits     Lower extremity strength 250 ft with cga and no AD, no LOB noted. Pt returns to room and seated in chair with CGA. Pt with grossly 10% of nl active cervical ROM for B rot, flex and ext. Pt provided with heat pack at end of session for pain control.  Pt left up in chair supported wi on the Elderly Mobility Scale, indicating patient is borderline in terms of safe mobility requiring some assist. Spouse currently unable to provide required level of assist.  Based on this evaluation, patient's clinical presentation is stable and overall t

## 2018-10-20 NOTE — PROGRESS NOTES
Started c/o of severe neck and head pain, despite of norco and zanaflex given, and hot packs applied. Was yelling out and attempting to get out of bed. Was saying his pain was getting worse. Jarod hospitalist. Morphine ordered and given 2 mg at 0350.  Will

## 2018-10-22 RX ORDER — ROSUVASTATIN CALCIUM 5 MG/1
TABLET, COATED ORAL
Qty: 90 TABLET | Refills: 0 | Status: SHIPPED | OUTPATIENT
Start: 2018-10-22 | End: 2019-01-23

## 2018-10-24 ENCOUNTER — TELEPHONE (OUTPATIENT)
Dept: FAMILY MEDICINE CLINIC | Facility: CLINIC | Age: 83
End: 2018-10-24

## 2018-10-24 NOTE — TELEPHONE ENCOUNTER
Delay in start of phy therapy due to unable to contact the pt. Planning on starting 10/27/18. Please call her back if you have any questions.

## 2018-10-26 ENCOUNTER — TELEPHONE (OUTPATIENT)
Dept: FAMILY MEDICINE CLINIC | Facility: CLINIC | Age: 83
End: 2018-10-26

## 2018-10-27 ENCOUNTER — OFFICE VISIT (OUTPATIENT)
Dept: FAMILY MEDICINE CLINIC | Facility: CLINIC | Age: 83
End: 2018-10-27
Payer: MEDICARE

## 2018-10-27 VITALS
TEMPERATURE: 98 F | HEIGHT: 68 IN | WEIGHT: 157 LBS | BODY MASS INDEX: 23.79 KG/M2 | DIASTOLIC BLOOD PRESSURE: 82 MMHG | RESPIRATION RATE: 16 BRPM | SYSTOLIC BLOOD PRESSURE: 164 MMHG | OXYGEN SATURATION: 94 % | HEART RATE: 80 BPM

## 2018-10-27 DIAGNOSIS — M54.12 CERVICAL RADICULOPATHY: Primary | ICD-10-CM

## 2018-10-27 PROCEDURE — 99213 OFFICE O/P EST LOW 20 MIN: CPT | Performed by: FAMILY MEDICINE

## 2018-10-27 RX ORDER — GABAPENTIN 100 MG/1
100 CAPSULE ORAL 3 TIMES DAILY
Qty: 270 CAPSULE | Refills: 0 | Status: SHIPPED | OUTPATIENT
Start: 2018-10-27 | End: 2019-01-03

## 2018-10-27 NOTE — PROGRESS NOTES
Here for follow-up. Injured his neck. He is always had a habit of spasm of moving his head and neck repetitively. He does this in part because of lifetime habit and also in part of the as part of his dementia.   On today's exam there is no rashes noted l

## 2018-11-04 VITALS
HEART RATE: 68 BPM | BODY MASS INDEX: 22.73 KG/M2 | TEMPERATURE: 98.7 F | DIASTOLIC BLOOD PRESSURE: 72 MMHG | OXYGEN SATURATION: 100 % | HEIGHT: 68 IN | SYSTOLIC BLOOD PRESSURE: 120 MMHG | WEIGHT: 150 LBS | RESPIRATION RATE: 14 BRPM

## 2018-11-05 ENCOUNTER — TELEPHONE (OUTPATIENT)
Dept: FAMILY MEDICINE CLINIC | Facility: CLINIC | Age: 83
End: 2018-11-05

## 2018-11-05 NOTE — TELEPHONE ENCOUNTER
Judah Strauss is requesting notes from visit with TERESITA regarding neck pain 10-    It is for patient to follow up with pain specialist.    Fax [phone]

## 2018-11-06 ENCOUNTER — TELEPHONE (OUTPATIENT)
Dept: FAMILY MEDICINE CLINIC | Facility: CLINIC | Age: 83
End: 2018-11-06

## 2018-11-06 NOTE — TELEPHONE ENCOUNTER
Charla Bowling pt therapist from [facility] stated pt is having constant pian on neck during and after therapy, therapist wants to hold the therapy till pt sees the pain management on the 20th. Please call and advise.

## 2018-11-08 ENCOUNTER — HOSPITAL ENCOUNTER (OUTPATIENT)
Age: 83
Discharge: HOME | End: 2018-11-08
Payer: MEDICARE

## 2018-11-08 VITALS
SYSTOLIC BLOOD PRESSURE: 119 MMHG | OXYGEN SATURATION: 97 % | DIASTOLIC BLOOD PRESSURE: 68 MMHG | HEART RATE: 87 BPM | RESPIRATION RATE: 18 BRPM | TEMPERATURE: 99 F

## 2018-11-08 DIAGNOSIS — M54.2 CHRONIC NECK PAIN: Primary | ICD-10-CM

## 2018-11-08 DIAGNOSIS — G89.29 CHRONIC NECK PAIN: Primary | ICD-10-CM

## 2018-11-08 PROCEDURE — 99213 OFFICE O/P EST LOW 20 MIN: CPT

## 2018-11-08 PROCEDURE — 99214 OFFICE O/P EST MOD 30 MIN: CPT

## 2018-11-08 RX ORDER — DEXAMETHASONE SODIUM PHOSPHATE 4 MG/ML
10 VIAL (ML) INJECTION ONCE
Status: COMPLETED | OUTPATIENT
Start: 2018-11-08 | End: 2018-11-08

## 2018-11-08 RX ORDER — PREDNISONE 10 MG/1
TABLET ORAL
Qty: 30 TABLET | Refills: 0 | Status: SHIPPED | OUTPATIENT
Start: 2018-11-08 | End: 2018-12-06 | Stop reason: ALTCHOICE

## 2018-11-08 NOTE — ED INITIAL ASSESSMENT (HPI)
C/o neck pain for about a month. Denies known injury. Was seen in ED on 10/20/2018 diagnosed with Cervical Arthritis. Was given Prednisone completed dose and got better.  Last week had Physical Therapy for 2 days and did the stretching exercises then pain s

## 2018-11-08 NOTE — ED PROVIDER NOTES
Patient Seen in: [facility]    History   Patient presents with:  Neck Pain    Stated Complaint: neck pain x 3 weeks    HPI    Lisbeth Nissen is a pleasant 59-year-old male who has been seen several times over the past 3 weeks for an acu stent placed   • Dementia    • Dermatophytosis of the body    • Diarrhea, unspecified    • Enthesopathy of unspecified site    • Essential hypertension    • Fatigue    • Hearing loss    • Heart disease    • High blood pressure    • High cholesterol    • Hi Quit date: 1974        Years since quittin.8      Smokeless tobacco: Never Used    Alcohol use:  Yes      Alcohol/week: 4.2 oz      Types: 7 Glasses of wine per week      Comment: 1-2 glasses of wine/day    Drug use: No      Review of Systems    P gross motor or sensory deficits appreciated.  Cranial nerves II through XII are intact.       ED Course   Labs Reviewed - No data to display       Reviewed imaging done in the emergency room      MDM   Discussed with the patient and his wife I do believe th

## 2018-11-12 ENCOUNTER — HOSPITAL ENCOUNTER (OUTPATIENT)
Dept: MRI IMAGING | Age: 83
Discharge: HOME | End: 2018-11-12
Attending: FAMILY MEDICINE
Payer: MEDICARE

## 2018-11-12 DIAGNOSIS — M54.12 CERVICAL RADICULOPATHY: ICD-10-CM

## 2018-11-12 DIAGNOSIS — R26.89 DECREASED MOBILITY: ICD-10-CM

## 2018-11-12 PROCEDURE — 72141 MRI NECK SPINE W/O DYE: CPT | Performed by: FAMILY MEDICINE

## 2018-11-21 ENCOUNTER — TELEPHONE (OUTPATIENT)
Dept: FAMILY MEDICINE CLINIC | Facility: CLINIC | Age: 83
End: 2018-11-21

## 2018-12-06 NOTE — PROGRESS NOTES
Here with wife for an annual Medicare wellness visit. We started by evaluating the template. Next came tuning fork exam.  He has lost much of his hearing on the right side. He is a former musician/.   Otoscopic exam is otherwise normal.  Sees anxiety much better we will see them back in 2 months thank you

## 2018-12-07 NOTE — PROGRESS NOTES
Gordon Tenorio REASON FOR VISIT:    Charity Carroll is a 82year old male who presents for a Medicare Annual Wellness visit.          Patient Care Team: Patient Care Team:  Fannie Mckeon DO as PCP - [facility])  Jimmy Virgen MD as Consulting Physician ([place] Sensorineural hearing loss (SNHL) of both ears     Colon polyps     Alteration of awareness     Urinary retention     Hypokalemia     Coronary artery disease involving native heart without angina pectoris     Dyslipidemia     Dementia without behavioral di Meloxicam 15 MG Oral Tab TK 1 T PO QD Disp:  Rfl: 0       Glucose and HbA1c Latest Ref Rng & Units 10/19/2018 10/17/2018 4/20/2018 1/19/2018 1/18/2018 1/17/2018 1/16/2018   Glucose 70 - 99 mg/dL 107(H) 131(H) 86 102(H) 99 90 69(L)   Some recent data [facility] Bathing or Showering: Able without help  Toileting: Able without help  Dressing: Able without help  Eating: Able without help  Driving: Cannot do without help(does not drive)  Preparing your meals: Cannot do without help  Managing money/bills: Cannot do GLUCOSE (mg/dL)   Date Value   04/22/2014 78   10/19/2011 105 (H)      Cardiovascular Disease Screening    LDL Annually LDL-CHOLESTEROL (mg/dL (calc))   Date Value   10/19/2011 116     LDL CHOLESTROL (mg/dL)   Date Value   12/02/2013 99     LDL Cholest • Dementia    • Dermatophytosis of the body    • Diarrhea, unspecified    • Enthesopathy of unspecified site    • Essential hypertension    • Fatigue    • Hearing loss    • Heart disease    • High blood pressure    • High cholesterol    • History of depr 18   Ht 68\"   Wt 157 lb   SpO2 94%   BMI 23.87 kg/m²    > BP Readings from Last 3 Encounters:  12/06/18 : 108/60  11/08/18 : 119/68  10/27/18 : Li Choudhury 164/82    r                Hearing Assessed via: [place]        ASSESSMENT AND OTHER RELEVANT CHRONIC C

## 2018-12-12 ENCOUNTER — APPOINTMENT (OUTPATIENT)
Dept: LAB | Age: 83
End: 2018-12-12
Attending: INTERNAL MEDICINE
Payer: MEDICARE

## 2018-12-12 DIAGNOSIS — R19.7 DIARRHEA, UNSPECIFIED TYPE: ICD-10-CM

## 2018-12-12 PROCEDURE — 87493 C DIFF AMPLIFIED PROBE: CPT

## 2018-12-21 RX ORDER — FINASTERIDE 5 MG/1
TABLET, FILM COATED ORAL
Qty: 90 TABLET | Refills: 3 | Status: SHIPPED | OUTPATIENT
Start: 2018-12-21 | End: 2019-12-30

## 2018-12-21 RX ORDER — TAMSULOSIN HYDROCHLORIDE 0.4 MG/1
CAPSULE ORAL
Qty: 90 CAPSULE | Refills: 3 | Status: SHIPPED | OUTPATIENT
Start: 2018-12-21 | End: 2019-12-30

## 2019-01-04 ENCOUNTER — TELEPHONE (OUTPATIENT)
Dept: FAMILY MEDICINE CLINIC | Facility: CLINIC | Age: 84
End: 2019-01-04

## 2019-01-04 ENCOUNTER — OFFICE VISIT (OUTPATIENT)
Dept: FAMILY MEDICINE CLINIC | Facility: CLINIC | Age: 84
End: 2019-01-04
Payer: MEDICARE

## 2019-01-04 VITALS
SYSTOLIC BLOOD PRESSURE: 110 MMHG | HEART RATE: 74 BPM | HEIGHT: 68 IN | TEMPERATURE: 98 F | WEIGHT: 155 LBS | RESPIRATION RATE: 18 BRPM | BODY MASS INDEX: 23.49 KG/M2 | OXYGEN SATURATION: 98 % | DIASTOLIC BLOOD PRESSURE: 70 MMHG

## 2019-01-04 DIAGNOSIS — J00 ACUTE NASOPHARYNGITIS: Primary | ICD-10-CM

## 2019-01-04 PROCEDURE — 99213 OFFICE O/P EST LOW 20 MIN: CPT | Performed by: NURSE PRACTITIONER

## 2019-01-04 RX ORDER — PAROXETINE HYDROCHLORIDE 40 MG/1
TABLET, FILM COATED ORAL
Qty: 90 TABLET | Refills: 0 | Status: SHIPPED | OUTPATIENT
Start: 2019-01-04 | End: 2019-03-30

## 2019-01-04 NOTE — PROGRESS NOTES
HPI:   James Vega is a 82year old male, brought in by wife as historian, who presents with ill symptoms for  3  days.  Patient's wife reports sore throat, congestion, cough with yellow or clear colored sputum, cough is not keeping pt up at night, OTC co Dermatophytosis of the body    • Diarrhea, unspecified    • Enthesopathy of unspecified site    • Essential hypertension    • Fatigue    • Hearing loss    • Heart disease    • High blood pressure    • High cholesterol    • History of depression    • Hyperl Wt 155 lb   SpO2 98%   BMI 23.57 kg/m²   GENERAL: well developed, well nourished,in no apparent distress, well appearing  HEENT: atraumatic, normocephalic,ears clear, nares with mild rhinorrhea, throat with no erythema or edema. Uvuvla midline.   No sinus

## 2019-01-08 ENCOUNTER — ANESTHESIA EVENT (OUTPATIENT)
Dept: ENDOSCOPY | Facility: HOSPITAL | Age: 84
End: 2019-01-08
Payer: MEDICARE

## 2019-01-10 ENCOUNTER — ANESTHESIA (OUTPATIENT)
Dept: ENDOSCOPY | Facility: HOSPITAL | Age: 84
End: 2019-01-10
Payer: MEDICARE

## 2019-01-10 ENCOUNTER — HOSPITAL ENCOUNTER (OUTPATIENT)
Facility: HOSPITAL | Age: 84
Setting detail: HOSPITAL OUTPATIENT SURGERY
Discharge: HOME | End: 2019-01-10
Attending: INTERNAL MEDICINE | Admitting: INTERNAL MEDICINE
Payer: MEDICARE

## 2019-01-10 VITALS
OXYGEN SATURATION: 94 % | TEMPERATURE: 98 F | RESPIRATION RATE: 18 BRPM | WEIGHT: 155 LBS | HEIGHT: 68 IN | BODY MASS INDEX: 23.49 KG/M2 | DIASTOLIC BLOOD PRESSURE: 61 MMHG | SYSTOLIC BLOOD PRESSURE: 109 MMHG | HEART RATE: 66 BPM

## 2019-01-10 DIAGNOSIS — K52.9 CHRONIC DIARRHEA OF UNKNOWN ORIGIN: ICD-10-CM

## 2019-01-10 PROCEDURE — 0DBN8ZX EXCISION OF SIGMOID COLON, VIA NATURAL OR ARTIFICIAL OPENING ENDOSCOPIC, DIAGNOSTIC: ICD-10-PCS | Performed by: INTERNAL MEDICINE

## 2019-01-10 PROCEDURE — 0DBF8ZX EXCISION OF RIGHT LARGE INTESTINE, VIA NATURAL OR ARTIFICIAL OPENING ENDOSCOPIC, DIAGNOSTIC: ICD-10-PCS | Performed by: INTERNAL MEDICINE

## 2019-01-10 PROCEDURE — 3E0H8GC INTRODUCTION OF OTHER THERAPEUTIC SUBSTANCE INTO LOWER GI, VIA NATURAL OR ARTIFICIAL OPENING ENDOSCOPIC: ICD-10-PCS | Performed by: INTERNAL MEDICINE

## 2019-01-10 PROCEDURE — 0DBB8ZX EXCISION OF ILEUM, VIA NATURAL OR ARTIFICIAL OPENING ENDOSCOPIC, DIAGNOSTIC: ICD-10-PCS | Performed by: INTERNAL MEDICINE

## 2019-01-10 PROCEDURE — 88305 TISSUE EXAM BY PATHOLOGIST: CPT | Performed by: INTERNAL MEDICINE

## 2019-01-10 PROCEDURE — 0DBM8ZX EXCISION OF DESCENDING COLON, VIA NATURAL OR ARTIFICIAL OPENING ENDOSCOPIC, DIAGNOSTIC: ICD-10-PCS | Performed by: INTERNAL MEDICINE

## 2019-01-10 PROCEDURE — 0DBL8ZX EXCISION OF TRANSVERSE COLON, VIA NATURAL OR ARTIFICIAL OPENING ENDOSCOPIC, DIAGNOSTIC: ICD-10-PCS | Performed by: INTERNAL MEDICINE

## 2019-01-10 PROCEDURE — 0DBP8ZX EXCISION OF RECTUM, VIA NATURAL OR ARTIFICIAL OPENING ENDOSCOPIC, DIAGNOSTIC: ICD-10-PCS | Performed by: INTERNAL MEDICINE

## 2019-01-10 RX ORDER — HYDROMORPHONE HYDROCHLORIDE 1 MG/ML
0.4 INJECTION, SOLUTION INTRAMUSCULAR; INTRAVENOUS; SUBCUTANEOUS EVERY 5 MIN PRN
Status: DISCONTINUED | OUTPATIENT
Start: 2019-01-10 | End: 2019-01-10

## 2019-01-10 RX ORDER — SODIUM CHLORIDE, SODIUM LACTATE, POTASSIUM CHLORIDE, CALCIUM CHLORIDE 600; 310; 30; 20 MG/100ML; MG/100ML; MG/100ML; MG/100ML
INJECTION, SOLUTION INTRAVENOUS CONTINUOUS
Status: DISCONTINUED | OUTPATIENT
Start: 2019-01-10 | End: 2019-01-10

## 2019-01-10 RX ORDER — NALOXONE HYDROCHLORIDE 0.4 MG/ML
80 INJECTION, SOLUTION INTRAMUSCULAR; INTRAVENOUS; SUBCUTANEOUS AS NEEDED
Status: DISCONTINUED | OUTPATIENT
Start: 2019-01-10 | End: 2019-01-10

## 2019-01-10 NOTE — ANESTHESIA POSTPROCEDURE EVALUATION
[facility] Patient Status:  Hospital Outpatient Surgery   Age/Gender 82year old male MRN [id number]   Location [address] [facility]. Attending Franc Tam MD   Hosp Day # 0 PCP Anita Glynn DO       Anesthesia Post-op No

## 2019-01-10 NOTE — H&P
[address] [facility],Third Floor Patient Status:  Hospital Outpatient Surgery    1935 MRN [id number]   [facility] ENDOSCOPY Attending Sherri Butler MD   Hosp Day # 0 PCP Rip Rob DO     History of Pr Relation Age of Onset   • Heart Disease Mother    • Heart Disorder Mother    • No Known Problems Father    • Other (stroke syndrome) Sister    • Bleeding Disorders Neg    • Clotting Disorder Neg    • Anesthesia Problems  Neg       reports that he quit smok rate/palpitations, high cholesterol or triglycerides, coronary stents. Respiratory: Denies shortness of breath, chronic/frequent hoarseness, wheezing, exposure to tuberculosis, chronic cough, spitting up blood, cough up sputum, sleep apnea.   Genitourinary systemic disease    Plan: Colonoscopy   Risk/Benefits: The risks and benefits of the procedure were discussed in detail with the patient, including the risk of bleeding, infection, pain, sedation, and perforation.   The patient was also informed that polyp

## 2019-01-10 NOTE — OPERATIVE REPORT
Colon operative report  Patient Name: Reina Berg  Procedure: Colonoscopy with cold forceps biopsies, and submucosal injection  Indication: Diarrhea  Date of last colonoscopy: N/A - the current procedure was performed for acute illness  Attending: Leonor Gramajo retroflexed position, and the rectal bulb was thus visualized. The endoscope was righted, and air was suctioned from the colon to the best of my ability, as it was during withdrawn from the colon. The endoscope was then removed from the patient.   The mary also have contributed to the overall appearance of the colon. However, I suspect that he likely has true Ulcerative colitis.     Recommendations:    1) Follow-up pathology   2) Would restart Mesalamine 2.4 grams po daily - However, if pathology suggests ac

## 2019-01-10 NOTE — ANESTHESIA PREPROCEDURE EVALUATION
PRE-OP EVALUATION    Patient Name: Parke Butter    Pre-op Diagnosis: Chronic diarrhea of unknown origin [K52.9]    Procedure(s):  COLONOSCOPY    Surgeon(s) and Role:     * Christa Huerta MD - Primary    Pre-op vitals reviewed.   Temp: 98 °F (36.7 °C)  P Neuro/Psych  Comment: dementia      (+) anxiety                            Past Surgical History:   Procedure Laterality Date   • CABG  3/1/00   • CHOLECYSTECTOMY  12/1/00   • COLONOSCOPY W/FECAL MICROBIOTA TRANSPLANT N/A 2/8/2018    Perf discussed with: patient and spouse                Present on Admission:  **None**

## 2019-01-23 RX ORDER — GABAPENTIN 100 MG/1
CAPSULE ORAL
Qty: 270 CAPSULE | Refills: 0 | Status: SHIPPED | OUTPATIENT
Start: 2019-01-23 | End: 2019-03-05 | Stop reason: ALTCHOICE

## 2019-01-23 RX ORDER — MIRTAZAPINE 15 MG/1
TABLET, FILM COATED ORAL
Qty: 60 TABLET | Refills: 0 | Status: SHIPPED | OUTPATIENT
Start: 2019-01-23 | End: 2019-03-30

## 2019-01-23 RX ORDER — ROSUVASTATIN CALCIUM 5 MG/1
TABLET, COATED ORAL
Qty: 90 TABLET | Refills: 0 | Status: SHIPPED | OUTPATIENT
Start: 2019-01-23 | End: 2019-04-30

## 2019-02-11 RX ORDER — CLOTRIMAZOLE AND BETAMETHASONE DIPROPIONATE 10; .64 MG/G; MG/G
0.05 CREAM TOPICAL 2 TIMES DAILY
Qty: 60 G | Refills: 1 | Status: SHIPPED | OUTPATIENT
Start: 2019-02-11 | End: 2020-01-14 | Stop reason: ALTCHOICE

## 2019-03-05 ENCOUNTER — OFFICE VISIT (OUTPATIENT)
Dept: FAMILY MEDICINE CLINIC | Facility: CLINIC | Age: 84
End: 2019-03-05
Payer: MEDICARE

## 2019-03-05 VITALS
BODY MASS INDEX: 24.25 KG/M2 | HEART RATE: 80 BPM | RESPIRATION RATE: 16 BRPM | OXYGEN SATURATION: 98 % | DIASTOLIC BLOOD PRESSURE: 82 MMHG | TEMPERATURE: 98 F | SYSTOLIC BLOOD PRESSURE: 164 MMHG | WEIGHT: 160 LBS | HEIGHT: 68 IN

## 2019-03-05 DIAGNOSIS — R10.84 GENERALIZED ABDOMINAL PAIN: Primary | ICD-10-CM

## 2019-03-05 PROBLEM — R10.30 LOWER ABDOMINAL PAIN: Status: ACTIVE | Noted: 2019-03-05

## 2019-03-05 PROCEDURE — 99213 OFFICE O/P EST LOW 20 MIN: CPT | Performed by: FAMILY MEDICINE

## 2019-03-05 NOTE — PROGRESS NOTES
Here with wife. She is concerned that he has abdomen especially the lower abdomen is bloating there is no nausea or vomiting or anorexia.   He has steadily worsening dementia of the Alzheimer's type today's exam vital signs normal he is anicteric well-hydr

## 2019-03-12 ENCOUNTER — HOSPITAL ENCOUNTER (OUTPATIENT)
Dept: ULTRASOUND IMAGING | Age: 84
Discharge: HOME | End: 2019-03-12
Attending: FAMILY MEDICINE
Payer: MEDICARE

## 2019-03-12 DIAGNOSIS — R10.84 GENERALIZED ABDOMINAL PAIN: ICD-10-CM

## 2019-03-12 PROCEDURE — 76700 US EXAM ABDOM COMPLETE: CPT | Performed by: FAMILY MEDICINE

## 2019-03-20 ENCOUNTER — TELEPHONE (OUTPATIENT)
Dept: FAMILY MEDICINE CLINIC | Facility: CLINIC | Age: 84
End: 2019-03-20

## 2019-04-01 RX ORDER — PAROXETINE HYDROCHLORIDE 40 MG/1
TABLET, FILM COATED ORAL
Qty: 90 TABLET | Refills: 0 | Status: SHIPPED | OUTPATIENT
Start: 2019-04-01 | End: 2019-06-28

## 2019-04-01 RX ORDER — MIRTAZAPINE 15 MG/1
TABLET, FILM COATED ORAL
Qty: 60 TABLET | Refills: 0 | Status: SHIPPED | OUTPATIENT
Start: 2019-04-01 | End: 2019-05-29

## 2019-04-09 NOTE — ED AVS SNAPSHOT
Cleopatra Santiago   MRN: [id number]    Department:  [facility] Emergency Department   Date of Visit:  5/25/2019           Disclosure     Insurance plans vary and the physician(s) referred by the ER may not be covered by your plan.  Please contact your tell this physician (or your personal doctor if your instructions are to return to your personal doctor) about any new or lasting problems. The primary care or specialist physician will see patients referred from the [facility] Emergency Department.  Dhruv Caicedo patient

## 2019-04-11 ENCOUNTER — HOSPITAL ENCOUNTER (OUTPATIENT)
Facility: HOSPITAL | Age: 84
Setting detail: HOSPITAL OUTPATIENT SURGERY
Discharge: HOME | End: 2019-04-11
Attending: INTERNAL MEDICINE | Admitting: INTERNAL MEDICINE
Payer: MEDICARE

## 2019-04-11 ENCOUNTER — ANESTHESIA (OUTPATIENT)
Dept: ENDOSCOPY | Facility: HOSPITAL | Age: 84
End: 2019-04-11
Payer: MEDICARE

## 2019-04-11 ENCOUNTER — ANESTHESIA EVENT (OUTPATIENT)
Dept: ENDOSCOPY | Facility: HOSPITAL | Age: 84
End: 2019-04-11
Payer: MEDICARE

## 2019-04-11 VITALS
TEMPERATURE: 98 F | DIASTOLIC BLOOD PRESSURE: 95 MMHG | SYSTOLIC BLOOD PRESSURE: 157 MMHG | OXYGEN SATURATION: 100 % | HEIGHT: 68 IN | RESPIRATION RATE: 16 BRPM | HEART RATE: 66 BPM | WEIGHT: 150 LBS | BODY MASS INDEX: 22.73 KG/M2

## 2019-04-11 DIAGNOSIS — K51.00 ULCERATIVE PANCOLITIS WITHOUT COMPLICATION (HCC): ICD-10-CM

## 2019-04-11 PROCEDURE — 0DBN8ZX EXCISION OF SIGMOID COLON, VIA NATURAL OR ARTIFICIAL OPENING ENDOSCOPIC, DIAGNOSTIC: ICD-10-PCS | Performed by: INTERNAL MEDICINE

## 2019-04-11 PROCEDURE — 88305 TISSUE EXAM BY PATHOLOGIST: CPT | Performed by: INTERNAL MEDICINE

## 2019-04-11 PROCEDURE — 0DBM8ZX EXCISION OF DESCENDING COLON, VIA NATURAL OR ARTIFICIAL OPENING ENDOSCOPIC, DIAGNOSTIC: ICD-10-PCS | Performed by: INTERNAL MEDICINE

## 2019-04-11 PROCEDURE — 0DBL8ZX EXCISION OF TRANSVERSE COLON, VIA NATURAL OR ARTIFICIAL OPENING ENDOSCOPIC, DIAGNOSTIC: ICD-10-PCS | Performed by: INTERNAL MEDICINE

## 2019-04-11 PROCEDURE — 0DBP8ZX EXCISION OF RECTUM, VIA NATURAL OR ARTIFICIAL OPENING ENDOSCOPIC, DIAGNOSTIC: ICD-10-PCS | Performed by: INTERNAL MEDICINE

## 2019-04-11 PROCEDURE — 0DBF8ZX EXCISION OF RIGHT LARGE INTESTINE, VIA NATURAL OR ARTIFICIAL OPENING ENDOSCOPIC, DIAGNOSTIC: ICD-10-PCS | Performed by: INTERNAL MEDICINE

## 2019-04-11 RX ORDER — SODIUM CHLORIDE, SODIUM LACTATE, POTASSIUM CHLORIDE, CALCIUM CHLORIDE 600; 310; 30; 20 MG/100ML; MG/100ML; MG/100ML; MG/100ML
INJECTION, SOLUTION INTRAVENOUS CONTINUOUS
Status: CANCELLED | OUTPATIENT
Start: 2019-04-11

## 2019-04-11 RX ORDER — ONDANSETRON 2 MG/ML
4 INJECTION INTRAMUSCULAR; INTRAVENOUS AS NEEDED
Status: CANCELLED | OUTPATIENT
Start: 2019-04-11 | End: 2019-04-11

## 2019-04-11 RX ORDER — NALOXONE HYDROCHLORIDE 0.4 MG/ML
80 INJECTION, SOLUTION INTRAMUSCULAR; INTRAVENOUS; SUBCUTANEOUS AS NEEDED
Status: CANCELLED | OUTPATIENT
Start: 2019-04-11 | End: 2019-04-11

## 2019-04-11 RX ORDER — SODIUM CHLORIDE, SODIUM LACTATE, POTASSIUM CHLORIDE, CALCIUM CHLORIDE 600; 310; 30; 20 MG/100ML; MG/100ML; MG/100ML; MG/100ML
INJECTION, SOLUTION INTRAVENOUS CONTINUOUS
Status: DISCONTINUED | OUTPATIENT
Start: 2019-04-11 | End: 2019-04-11

## 2019-04-11 NOTE — ANESTHESIA POSTPROCEDURE EVALUATION
[address] [facility] Patient Status:  Hospital Outpatient Surgery   Age/Gender 82year old male MRN [id number]   Location [facility]. Attending Steve Herman MD   Hosp Day # 0 PCP Sawyer Meadows DO       Anesthesia Post-op No

## 2019-04-11 NOTE — H&P
[address] [facility],Third Floor Patient Status:  Hospital Outpatient Surgery    1935 MRN [id number]   [facility] ENDOSCOPY Attending Susanna Simeon MD   Hosp Day # 0 PCP Elisha Evans DO     History of Pr N/A 3/15/2016    Performed by Baldev Galeano at [address] [place] [address]   • SINUS SURGERY       • TONSILLECTOMY       Family History   Problem Relation Age of Onset   • Heart Disease Mother    • Heart Disorder Mother    • No Known Problems Father    • Other (stroke sy apnea. Genitourinary: Denies kidney stones, painful/difficult urination, frequent urinary infections, frequent urination, blood in urine, incontinence, kidney failure, prostate problems. Endocrine: Denies thyroid disease, denies diabetes.   Female complai chromoendoscopy  Risk/Benefits: The risks and benefits of the procedure were discussed in detail with the patient, including the risk of bleeding, infection, pain, sedation, and perforation.   The patient was also informed that polyps and tumors can be mis

## 2019-04-11 NOTE — ANESTHESIA PREPROCEDURE EVALUATION
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         Natalia Palma GI/Hepatic/Renal      (+) GERD                           Cardiovascular  Comment: 1. No reversible ischemia identified. 2.  Diminished left ventricular function with a calculated ejection fraction of 38%. Mid septal hypokinesis is noted.    3.  EKG respo dysfunction) of organic origin     BPH with urinary obstruction     Impaired fasting glucose     Hearing loss, conductive, external ear     Cataract     Atrophic rhinitis     Mild cognitive impairment     Cervical radiculopathy due to degenerative joint di Use      Smoking status: Former Smoker        Packs/day: 2.50        Years: 20.00        Pack years: 48        Types: Cigarettes        Quit date: 1974        Years since quittin.2      Smokeless tobacco: Never Used    Alcohol use:  Yes      Alcoh

## 2019-04-11 NOTE — OPERATIVE REPORT
Colon operative report  Patient Name: Lopez Bishpo  Procedure: Colonoscopy with cold forceps biopsies  Date of procedure: 4/11/2019    Indication: Ulcerative colitis  Date of last colonoscopy: 1/10/2019 - the current procedure was a repeat for chromoendos rectum, it was placed in a retroflexed position, and the rectal bulb was thus visualized. The endoscope was righted, and air was suctioned from the colon to the best of my ability, as it was during withdrawn from the colon.   The endoscope was then removed ileal mucosa was found to be normal to the extent examined. Because of the suboptimal preparation (adequate for polyp or mass detection), no chromoendoscopy was performed.     Impression: Findings as above suggest either quiescent pan-colonic ulcerative c

## 2019-04-30 RX ORDER — ROSUVASTATIN CALCIUM 5 MG/1
TABLET, COATED ORAL
Qty: 90 TABLET | Refills: 0 | Status: SHIPPED | OUTPATIENT
Start: 2019-04-30 | End: 2019-09-15

## 2019-04-30 RX ORDER — GABAPENTIN 100 MG/1
CAPSULE ORAL
Qty: 270 CAPSULE | Refills: 0 | Status: SHIPPED | OUTPATIENT
Start: 2019-04-30 | End: 2020-01-14 | Stop reason: ALTCHOICE

## 2019-04-30 NOTE — TELEPHONE ENCOUNTER
LAST REFILL-1/23/19        LAST REFILL-1/23/19  WAS TAKEN OUT OF [facility] MEDICATIONS       LAST VISIT-3/5/19

## 2019-05-24 ENCOUNTER — HOSPITAL ENCOUNTER (OUTPATIENT)
Dept: GENERAL RADIOLOGY | Age: 84
Discharge: HOME | End: 2019-05-24
Attending: INTERNAL MEDICINE
Payer: MEDICARE

## 2019-05-24 DIAGNOSIS — K51.00 ULCERATIVE PANCOLITIS WITHOUT COMPLICATION (HCC): ICD-10-CM

## 2019-05-24 DIAGNOSIS — R14.0 ABDOMINAL BLOATING: ICD-10-CM

## 2019-05-24 PROCEDURE — 74019 RADEX ABDOMEN 2 VIEWS: CPT | Performed by: INTERNAL MEDICINE

## 2019-05-25 ENCOUNTER — APPOINTMENT (OUTPATIENT)
Dept: CT IMAGING | Facility: HOSPITAL | Age: 84
End: 2019-05-25
Attending: EMERGENCY MEDICINE
Payer: MEDICARE

## 2019-05-25 ENCOUNTER — HOSPITAL ENCOUNTER (EMERGENCY)
Facility: HOSPITAL | Age: 84
Discharge: HOME | End: 2019-05-25
Attending: EMERGENCY MEDICINE
Payer: MEDICARE

## 2019-05-25 ENCOUNTER — APPOINTMENT (OUTPATIENT)
Dept: GENERAL RADIOLOGY | Facility: HOSPITAL | Age: 84
End: 2019-05-25
Payer: MEDICARE

## 2019-05-25 VITALS
RESPIRATION RATE: 18 BRPM | WEIGHT: 150 LBS | HEIGHT: 70 IN | OXYGEN SATURATION: 99 % | SYSTOLIC BLOOD PRESSURE: 136 MMHG | DIASTOLIC BLOOD PRESSURE: 81 MMHG | HEART RATE: 80 BPM | TEMPERATURE: 98 F | BODY MASS INDEX: 21.47 KG/M2

## 2019-05-25 DIAGNOSIS — K21.9 GASTROESOPHAGEAL REFLUX DISEASE, ESOPHAGITIS PRESENCE NOT SPECIFIED: Primary | ICD-10-CM

## 2019-05-25 PROCEDURE — 80053 COMPREHEN METABOLIC PANEL: CPT | Performed by: EMERGENCY MEDICINE

## 2019-05-25 PROCEDURE — 85025 COMPLETE CBC W/AUTO DIFF WBC: CPT

## 2019-05-25 PROCEDURE — 74177 CT ABD & PELVIS W/CONTRAST: CPT | Performed by: EMERGENCY MEDICINE

## 2019-05-25 PROCEDURE — 71045 X-RAY EXAM CHEST 1 VIEW: CPT

## 2019-05-25 PROCEDURE — 93005 ELECTROCARDIOGRAM TRACING: CPT

## 2019-05-25 PROCEDURE — 85025 COMPLETE CBC W/AUTO DIFF WBC: CPT | Performed by: EMERGENCY MEDICINE

## 2019-05-25 PROCEDURE — 83690 ASSAY OF LIPASE: CPT | Performed by: EMERGENCY MEDICINE

## 2019-05-25 PROCEDURE — 99284 EMERGENCY DEPT VISIT MOD MDM: CPT

## 2019-05-25 PROCEDURE — 80053 COMPREHEN METABOLIC PANEL: CPT

## 2019-05-25 PROCEDURE — 84484 ASSAY OF TROPONIN QUANT: CPT | Performed by: EMERGENCY MEDICINE

## 2019-05-25 PROCEDURE — 36415 COLL VENOUS BLD VENIPUNCTURE: CPT

## 2019-05-25 PROCEDURE — 99285 EMERGENCY DEPT VISIT HI MDM: CPT

## 2019-05-25 PROCEDURE — 84484 ASSAY OF TROPONIN QUANT: CPT

## 2019-05-25 PROCEDURE — 93010 ELECTROCARDIOGRAM REPORT: CPT

## 2019-05-25 RX ORDER — MAGNESIUM HYDROXIDE/ALUMINUM HYDROXICE/SIMETHICONE 120; 1200; 1200 MG/30ML; MG/30ML; MG/30ML
30 SUSPENSION ORAL ONCE
Status: COMPLETED | OUTPATIENT
Start: 2019-05-25 | End: 2019-05-25

## 2019-05-25 RX ORDER — NICOTINE POLACRILEX 4 MG/1
20 GUM, CHEWING ORAL DAILY
Qty: 30 TABLET | Refills: 0 | Status: SHIPPED | OUTPATIENT
Start: 2019-05-25 | End: 2019-06-14

## 2019-05-25 NOTE — ED PROVIDER NOTES
Patient Seen in: [facility] Emergency Department    History   No chief complaint on file.     Stated Complaint: Epigastric/ Chest Pain    HPI    53-year-old male complaining of epigastric abdominal pain the patient has a history of mild dementia is s TRANSPLANT N/A 2/8/2018    Performed by Naren Donald MD at [facility] ENDOSCOPY   • OTHER SURGICAL HISTORY      prostate surgery   • REMOVAL GALLBLADDER     • SEPTORHINOPLASTY WITH OSTEOTOMIES AND  GRAFTS N/A 3/15/2016    Performed by Fiorella Serrano at [facility] All other components within normal limits   CBC W/ DIFFERENTIAL - Abnormal; Notable for the following components:    RBC 3.74 (*)     HGB 12.8 (*)     HCT 35.6 (*)     MCH 34.2 (*)     All other components within normal limits   TROPONIN I - Normal   LIPAS diverticulitis. The appendix is normal.  3. Multiple cysts in the liver are noted. Several cysts in the kidneys are noted. Renal collecting systems are not dilated.   4. Dystrophic calcification the prostate gland which has mild mass effect on the bladde

## 2019-05-25 NOTE — ED NOTES
Pt presents to the ED with c/o sudden onset epigastric abdominal pain/chest pain since 1330. Pt ate earlier today without issue. Pt states the pain is much improved at this time, difficult to put a number to the pain at this time, family at bedside.

## 2019-05-25 NOTE — ED INITIAL ASSESSMENT (HPI)
Pt c/o epigastric pain that started today around 45 min ago, Pt rpt having hx epigastric pain but rpt this is different, Pt does have cardiac history of stent placement, PT rpt it started about 30 min after he ate, some nausea, no vomiting and lots of \"be

## 2019-05-29 RX ORDER — MIRTAZAPINE 15 MG/1
TABLET, FILM COATED ORAL
Qty: 60 TABLET | Refills: 0 | Status: SHIPPED | OUTPATIENT
Start: 2019-05-29 | End: 2019-08-02

## 2019-06-28 RX ORDER — PAROXETINE HYDROCHLORIDE 40 MG/1
TABLET, FILM COATED ORAL
Qty: 90 TABLET | Refills: 1 | Status: SHIPPED | OUTPATIENT
Start: 2019-06-28 | End: 2019-12-30

## 2019-07-01 RX ORDER — SERTRALINE HYDROCHLORIDE 100 MG/1
TABLET, FILM COATED ORAL
Qty: 90 TABLET | Refills: 0 | OUTPATIENT
Start: 2019-07-01

## 2019-08-02 RX ORDER — MIRTAZAPINE 15 MG/1
TABLET, FILM COATED ORAL
Qty: 60 TABLET | Refills: 0 | Status: SHIPPED | OUTPATIENT
Start: 2019-08-02 | End: 2019-10-09

## 2019-09-16 RX ORDER — ROSUVASTATIN CALCIUM 5 MG/1
TABLET, COATED ORAL
Qty: 90 TABLET | Refills: 0 | Status: SHIPPED | OUTPATIENT
Start: 2019-09-16 | End: 2019-12-15

## 2019-10-03 NOTE — PLAN OF CARE
GASTROINTESTINAL - ADULT    • Minimal or absence of nausea and vomiting Progressing    • Maintains or returns to baseline bowel function Progressing        METABOLIC/FLUID AND ELECTROLYTES - ADULT    • Electrolytes maintained within normal limits Progressi Oriented - self; Oriented - place; Oriented - time

## 2019-10-09 RX ORDER — MIRTAZAPINE 15 MG/1
TABLET, FILM COATED ORAL
Qty: 60 TABLET | Refills: 0 | Status: SHIPPED | OUTPATIENT
Start: 2019-10-09 | End: 2019-12-10

## 2019-10-14 NOTE — PROGRESS NOTES
Here with wife for general review he is a man with slowly progressive dementia of the Alzheimer's type. He also has hypertension.   His initial blood pressure today was over 160/90 but on observation and repeat testing it dropped to 145/85 with normal puls

## 2019-10-15 ENCOUNTER — APPOINTMENT (OUTPATIENT)
Dept: LAB | Age: 84
End: 2019-10-15
Attending: INTERNAL MEDICINE
Payer: MEDICARE

## 2019-10-15 DIAGNOSIS — K51.00 ULCERATIVE PANCOLITIS WITHOUT COMPLICATION (HCC): ICD-10-CM

## 2019-10-15 PROCEDURE — 83993 ASSAY FOR CALPROTECTIN FECAL: CPT

## 2019-12-10 RX ORDER — MIRTAZAPINE 15 MG/1
TABLET, FILM COATED ORAL
Qty: 60 TABLET | Refills: 0 | Status: SHIPPED | OUTPATIENT
Start: 2019-12-10

## 2019-12-16 RX ORDER — ROSUVASTATIN CALCIUM 5 MG/1
TABLET, COATED ORAL
Qty: 90 TABLET | Refills: 3 | Status: SHIPPED | OUTPATIENT
Start: 2019-12-16

## 2019-12-26 NOTE — PROGRESS NOTES
The patient wife states his memory has decreased. He does not remember his wife. Increase in fatigue. One recent fall. Increase in balance. Trouble with word finding. Denies any headaches. Decrease in appetite.

## 2019-12-26 NOTE — PROGRESS NOTES
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   • Personal history of other diseases of circulato Oral Tab TAKE 1 TABLET(15 MG) BY MOUTH EVERY NIGHT 60 tablet 0   • BUDESONIDE 3 MG Oral Cap DR Particles TAKE 3 CAPSULES BY MOUTH EVERY MORNING 90 capsule 3   • triamcinolone acetonide 0.1 % External Ointment triamcinolone acetonide 0.1 % topical ointment appetite,   EYES: no visual complaints or deficits  HEENT: denies nasal congestion, sinus pain or sore throat; no  hearing loss;  RESPIRATORY: denies shortness of breath, wheezing or cough   CARDIOVASCULAR: denies chest pain, no palpitations   GI: denies n Symmetric facial movement       CN VIII:  Normal hearing       CN IX, XI:  Normal Gag, uvula palate midline       CN XII:  SCM strong, equal shoulder shrug  Motor:  No drift, rapid finger movement symmetric. 5/5 in all limbs with normal tone.  No tremor of aricept to 20 mg qhs, along with namenda 10 mg bid, side effect was discussed,   He needs NH placement if wife can not take care of him      Return in about 1 year (around 12/26/2020). We discussed in depth regarding diagnosis, prognosis, treatment.  The

## 2019-12-27 ENCOUNTER — TELEPHONE (OUTPATIENT)
Dept: FAMILY MEDICINE CLINIC | Facility: CLINIC | Age: 84
End: 2019-12-27

## 2019-12-27 NOTE — TELEPHONE ENCOUNTER
PAPERWORK FROM [facility]. FORMS NEED TO BE FILLED OUT FOR ADMISSION TO [facility] FOR PATIENT.

## 2019-12-30 RX ORDER — TAMSULOSIN HYDROCHLORIDE 0.4 MG/1
CAPSULE ORAL
Qty: 90 CAPSULE | Refills: 3 | Status: SHIPPED | OUTPATIENT
Start: 2019-12-30

## 2019-12-30 RX ORDER — PAROXETINE HYDROCHLORIDE 40 MG/1
TABLET, FILM COATED ORAL
Qty: 90 TABLET | Refills: 1 | Status: SHIPPED | OUTPATIENT
Start: 2019-12-30

## 2019-12-30 RX ORDER — FINASTERIDE 5 MG/1
TABLET, FILM COATED ORAL
Qty: 90 TABLET | Refills: 3 | Status: SHIPPED | OUTPATIENT
Start: 2019-12-30

## 2020-01-02 ENCOUNTER — APPOINTMENT (OUTPATIENT)
Dept: CT IMAGING | Facility: HOSPITAL | Age: 85
End: 2020-01-02
Attending: NURSE PRACTITIONER
Payer: MEDICARE

## 2020-01-02 ENCOUNTER — HOSPITAL ENCOUNTER (EMERGENCY)
Facility: HOSPITAL | Age: 85
Discharge: HOME | End: 2020-01-02
Attending: EMERGENCY MEDICINE
Payer: MEDICARE

## 2020-01-02 VITALS
SYSTOLIC BLOOD PRESSURE: 142 MMHG | HEART RATE: 94 BPM | BODY MASS INDEX: 26 KG/M2 | DIASTOLIC BLOOD PRESSURE: 94 MMHG | RESPIRATION RATE: 19 BRPM | WEIGHT: 176 LBS | OXYGEN SATURATION: 96 % | TEMPERATURE: 97 F

## 2020-01-02 DIAGNOSIS — A04.72 CLOSTRIDIUM DIFFICILE COLITIS: Primary | ICD-10-CM

## 2020-01-02 DIAGNOSIS — K51.00 ULCERATIVE PANCOLITIS WITHOUT COMPLICATION (HCC): ICD-10-CM

## 2020-01-02 LAB
ALBUMIN SERPL-MCNC: 3.8 G/DL (ref 3.4–5)
ALBUMIN/GLOB SERPL: 0.9 {RATIO} (ref 1–2)
ALP LIVER SERPL-CCNC: 70 U/L (ref 45–117)
ALT SERPL-CCNC: 42 U/L (ref 16–61)
ANION GAP SERPL CALC-SCNC: 9 MMOL/L (ref 0–18)
AST SERPL-CCNC: 30 U/L (ref 15–37)
BASOPHILS # BLD AUTO: 0.1 X10(3) UL (ref 0–0.2)
BASOPHILS NFR BLD AUTO: 0.6 %
BILIRUB SERPL-MCNC: 1.8 MG/DL (ref 0.1–2)
BUN BLD-MCNC: 31 MG/DL (ref 7–18)
BUN/CREAT SERPL: 20.1 (ref 10–20)
CALCIUM BLD-MCNC: 9.7 MG/DL (ref 8.5–10.1)
CHLORIDE SERPL-SCNC: 111 MMOL/L (ref 98–112)
CO2 SERPL-SCNC: 21 MMOL/L (ref 21–32)
CREAT BLD-MCNC: 1.54 MG/DL (ref 0.7–1.3)
DEPRECATED RDW RBC AUTO: 46.6 FL (ref 35.1–46.3)
EOSINOPHIL # BLD AUTO: 0.09 X10(3) UL (ref 0–0.7)
EOSINOPHIL NFR BLD AUTO: 0.5 %
ERYTHROCYTE [DISTWIDTH] IN BLOOD BY AUTOMATED COUNT: 13.1 % (ref 11–15)
GLOBULIN PLAS-MCNC: 4.4 G/DL (ref 2.8–4.4)
GLUCOSE BLD-MCNC: 152 MG/DL (ref 70–99)
HAV AB SER QL IA: NONREACTIVE
HBV SURFACE AB SER QL: NONREACTIVE
HBV SURFACE AB SERPL IA-ACNC: <3.1 MIU/ML
HBV SURFACE AG SER-ACNC: <0.1 [IU]/L
HBV SURFACE AG SERPL QL IA: NONREACTIVE
HCT VFR BLD AUTO: 43.7 % (ref 39–53)
HCV AB SERPL QL IA: NONREACTIVE
HGB BLD-MCNC: 15 G/DL (ref 13–17.5)
IMM GRANULOCYTES # BLD AUTO: 0.08 X10(3) UL (ref 0–1)
IMM GRANULOCYTES NFR BLD: 0.5 %
LYMPHOCYTES # BLD AUTO: 2.01 X10(3) UL (ref 1–4)
LYMPHOCYTES NFR BLD AUTO: 12.2 %
M PROTEIN MFR SERPL ELPH: 8.2 G/DL (ref 6.4–8.2)
MCH RBC QN AUTO: 33.6 PG (ref 26–34)
MCHC RBC AUTO-ENTMCNC: 34.3 G/DL (ref 31–37)
MCV RBC AUTO: 98 FL (ref 80–100)
MONOCYTES # BLD AUTO: 1.75 X10(3) UL (ref 0.1–1)
MONOCYTES NFR BLD AUTO: 10.7 %
NEUTROPHILS # BLD AUTO: 12.38 X10 (3) UL (ref 1.5–7.7)
NEUTROPHILS # BLD AUTO: 12.38 X10(3) UL (ref 1.5–7.7)
NEUTROPHILS NFR BLD AUTO: 75.5 %
OSMOLALITY SERPL CALC.SUM OF ELEC: 302 MOSM/KG (ref 275–295)
PLATELET # BLD AUTO: 286 10(3)UL (ref 150–450)
POTASSIUM SERPL-SCNC: 4.3 MMOL/L (ref 3.5–5.1)
RBC # BLD AUTO: 4.46 X10(6)UL (ref 3.8–5.8)
SODIUM SERPL-SCNC: 141 MMOL/L (ref 136–145)
WBC # BLD AUTO: 16.4 X10(3) UL (ref 4–11)

## 2020-01-02 PROCEDURE — 74177 CT ABD & PELVIS W/CONTRAST: CPT | Performed by: NURSE PRACTITIONER

## 2020-01-02 PROCEDURE — 80053 COMPREHEN METABOLIC PANEL: CPT | Performed by: EMERGENCY MEDICINE

## 2020-01-02 PROCEDURE — 99284 EMERGENCY DEPT VISIT MOD MDM: CPT

## 2020-01-02 PROCEDURE — 87340 HEPATITIS B SURFACE AG IA: CPT | Performed by: INTERNAL MEDICINE

## 2020-01-02 PROCEDURE — 86803 HEPATITIS C AB TEST: CPT | Performed by: INTERNAL MEDICINE

## 2020-01-02 PROCEDURE — 86706 HEP B SURFACE ANTIBODY: CPT | Performed by: INTERNAL MEDICINE

## 2020-01-02 PROCEDURE — 86708 HEPATITIS A ANTIBODY: CPT | Performed by: INTERNAL MEDICINE

## 2020-01-02 PROCEDURE — 85025 COMPLETE CBC W/AUTO DIFF WBC: CPT | Performed by: EMERGENCY MEDICINE

## 2020-01-02 PROCEDURE — 80053 COMPREHEN METABOLIC PANEL: CPT

## 2020-01-02 PROCEDURE — 96361 HYDRATE IV INFUSION ADD-ON: CPT

## 2020-01-02 PROCEDURE — 96374 THER/PROPH/DIAG INJ IV PUSH: CPT

## 2020-01-02 PROCEDURE — 85025 COMPLETE CBC W/AUTO DIFF WBC: CPT

## 2020-01-02 RX ORDER — ONDANSETRON 2 MG/ML
4 INJECTION INTRAMUSCULAR; INTRAVENOUS ONCE
Status: COMPLETED | OUTPATIENT
Start: 2020-01-02 | End: 2020-01-02

## 2020-01-02 RX ORDER — VANCOMYCIN HYDROCHLORIDE 125 MG/1
125 CAPSULE ORAL 4 TIMES DAILY
Qty: 40 CAPSULE | Refills: 0 | Status: SHIPPED | OUTPATIENT
Start: 2020-01-02 | End: 2020-01-07 | Stop reason: DRUGHIGH

## 2020-01-02 NOTE — ED PROVIDER NOTES
Patient Seen in: [facility] Emergency Department      History   Patient presents with:  Nausea/Vomiting/Diarrhea    Stated Complaint: V/D x3 days     HPI  63-year-old male with history of dementia who his wife is giving most of the information state TRANSPLANT N/A 2/8/2018    Performed by Delisa Lau MD at [facility] ENDOSCOPY   • OTHER SURGICAL HISTORY      prostate surgery   • REMOVAL GALLBLADDER     • SEPTORHINOPLASTY WITH OSTEOTOMIES AND  GRAFTS N/A 3/15/2016    Performed by Baldev Galeano at [facility] Rate and Rhythm: Normal rate and regular rhythm. Heart sounds: Normal heart sounds. Pulmonary:      Effort: Pulmonary effort is normal.      Breath sounds: Normal breath sounds. Abdominal:      General: Abdomen is flat.  Bowel sounds are jessi Abdomen+pelvis(contrast Only)(cpt=74177)    Result Date: 1/2/2020  PROCEDURE:  CT ABDOMEN+PELVIS (CONTRAST ONLY) (CPT=74177)  COMPARISON:  [facility] , CT, CT ABDOMEN PELVIS IV CONTRAST, NO ORAL (ER), 5/25/2019, 15:43.   INDICATIONS:  V/D x3 days  TECHNIQUE:  C likely. Dictated by: Marilynn Orr MD on 1/02/2020 at 14:40     Approved by: Marilynn Orr MD on 1/02/2020 at 14:43                MDM   CBC with mildly elevated white count. Chemistry with mildly elevated creatinine.   A liter of normal saline

## 2020-01-02 NOTE — ED INITIAL ASSESSMENT (HPI)
Per patient, diarrhea for 3 days with vomiting that started last night. Patient has dementia and is oriented to person and situation only. Family states this is normal for him. Denies pain. Feels weak.

## 2020-01-14 ENCOUNTER — OFFICE VISIT (OUTPATIENT)
Dept: FAMILY MEDICINE CLINIC | Facility: CLINIC | Age: 85
End: 2020-01-14
Payer: MEDICARE

## 2020-01-14 VITALS
OXYGEN SATURATION: 98 % | SYSTOLIC BLOOD PRESSURE: 144 MMHG | DIASTOLIC BLOOD PRESSURE: 80 MMHG | HEART RATE: 77 BPM | TEMPERATURE: 98 F | WEIGHT: 176 LBS | BODY MASS INDEX: 26.07 KG/M2 | HEIGHT: 69 IN | RESPIRATION RATE: 18 BRPM

## 2020-01-14 DIAGNOSIS — Z00.00 ANNUAL PHYSICAL EXAM: Primary | ICD-10-CM

## 2020-01-14 DIAGNOSIS — Z23 NEED FOR VACCINATION: ICD-10-CM

## 2020-01-14 PROCEDURE — 90732 PPSV23 VACC 2 YRS+ SUBQ/IM: CPT | Performed by: FAMILY MEDICINE

## 2020-01-14 PROCEDURE — G0439 PPPS, SUBSEQ VISIT: HCPCS | Performed by: FAMILY MEDICINE

## 2020-01-14 PROCEDURE — G0009 ADMIN PNEUMOCOCCAL VACCINE: HCPCS | Performed by: FAMILY MEDICINE

## 2020-01-14 NOTE — H&P
Beti Kennedy is a 82year old male who presents for a complete physical exam.   HPI:   Pt is here to complete paperwork for his transfer into a nursing home. He notes normal sleep and appetite. Hx of COPD.  HX of bypass surgery 20 years ago and stent place Date   • Acute bronchitis    • Acute sinusitis, unspecified    • Allergic rhinitis, cause unspecified    • Atherosclerosis of coronary artery    • Back pain    • Black stools    • C. difficile enteritis    • Contact dermatitis and other eczema, due to unsp Tobacco Use      Smoking status: Former Smoker        Packs/day: 2.50        Years: 20.00        Pack years: 48        Types: Cigarettes        Quit date: 1974        Years since quittin.0      Smokeless tobacco: Never Used    Alcohol use:  Yes residence. PSYCH: Mood and affect appropriate    ASSESSMENT AND PLAN:   Letty Moctezuma is a 82year old male who presents for a complete physical exam. Heart healthy diet, routine exercise, and annual flu vaccines encouraged.   The patient indicates underst

## 2020-01-21 ENCOUNTER — TELEPHONE (OUTPATIENT)
Dept: FAMILY MEDICINE CLINIC | Facility: CLINIC | Age: 85
End: 2020-01-21

## 2020-01-21 NOTE — TELEPHONE ENCOUNTER
Pt recently moved to [facility] - nurse there asking if pt has c-diff as they will need to apply precautionary measures if so. He is currently taking vancomycin.   Pls advise

## 2020-02-02 NOTE — TELEPHONE ENCOUNTER
[facility] requesting a return call. Pulled form from [employer] file, now on [id number] shelf in Triage. none

## 2020-02-25 ENCOUNTER — TELEPHONE (OUTPATIENT)
Dept: FAMILY MEDICINE CLINIC | Facility: CLINIC | Age: 85
End: 2020-02-25

## 2020-02-25 NOTE — TELEPHONE ENCOUNTER
Pt's wife dropped off a [email or web address] ([address] [facility] and [address] [facility]) she asked to please add the date of 1st diagnosis around August 2014 and to please  complete and mail to: Terra Laura [id number] [address], [facility]

## 2020-03-24 ENCOUNTER — TELEPHONE (OUTPATIENT)
Dept: FAMILY MEDICINE CLINIC | Facility: CLINIC | Age: 85
End: 2020-03-24

## 2020-03-24 NOTE — TELEPHONE ENCOUNTER
Long term care claims- attention needed in 2 sections of form- IV and VI      Fax in [facility] [address] triage bin

## 2020-03-31 ENCOUNTER — TELEPHONE (OUTPATIENT)
Dept: FAMILY MEDICINE CLINIC | Facility: CLINIC | Age: 85
End: 2020-03-31

## 2020-05-04 ENCOUNTER — TELEPHONE (OUTPATIENT)
Dept: FAMILY MEDICINE CLINIC | Facility: CLINIC | Age: 85
End: 2020-05-04

## 2020-05-04 NOTE — TELEPHONE ENCOUNTER
Unable to locate form in office. Pt wife states a family member dropped the form off on Wednesday. Pt wife will have form faxed. Await fax.

## 2020-05-06 ENCOUNTER — MED REC SCAN ONLY (OUTPATIENT)
Dept: FAMILY MEDICINE CLINIC | Facility: CLINIC | Age: 85
End: 2020-05-06

## 2020-05-29 ENCOUNTER — TELEPHONE (OUTPATIENT)
Dept: FAMILY MEDICINE CLINIC | Facility: CLINIC | Age: 85
End: 2020-05-29

## 2020-05-29 NOTE — TELEPHONE ENCOUNTER
Disability paperwork needs some date change and son says paperwork was sent over on the 21st?  Pls advise status

## 2020-06-01 ENCOUNTER — TELEPHONE (OUTPATIENT)
Dept: FAMILY MEDICINE CLINIC | Facility: CLINIC | Age: 85
End: 2020-06-01

## 2020-06-01 NOTE — TELEPHONE ENCOUNTER
WIFE CALLING  TO SAY THAT DR RIZO FILLED OUT PAPERWORK FOR THIS PATIENT WHO IS IN MEMORY [facility]. PAPERWORK NEEDS TO SAY HE WAS SENT / TREATED TO [facility]ARE IN January 2020 NOT MARCH AS INDICATED ON THE PAPERWORK.     THEY WILL CALL BACK WITH A FAX # FOR THE

## 2020-06-01 NOTE — TELEPHONE ENCOUNTER
Okay to change date to 8/1/2014 when pt was first diagnosed with cognitive impairment?    Form left on your desk to change date and initial.

## 2020-06-01 NOTE — TELEPHONE ENCOUNTER
Son called in regards to form. See Med Rec Scan on 5/6/2020. States that he needs date on last page changed to the date of diagnosis 8/1/2014 instead of 3/2/2020. Once date is changed, please contact him.   Form will need to be refaxed to [facility] at

## 2021-01-01 DIAGNOSIS — Z23 NEED FOR VACCINATION: ICD-10-CM

## 2021-02-19 NOTE — TELEPHONE ENCOUNTER
Patient is being discharged from [address] [place] [address].   Patients neck pain is getting worse and he will be going to a pain clinic for management
fyi- see message from [facility]
58 y/o female with no known past medical history. Had seen Dr. Mina in the past? Presents to [facility]ED for chest pain for past two days. Pt states pressure started after she shoveled snow, described as midsternal, constant, radiating intermittently to right side of back, 2-3/10, lasting for hours in duration, better with drinking hot tea. In ED, D-dimer-226, Tropx3-negative, CTA Chest- negative for PE, nuclear stress test was negative for ischemia, TTE revealed normal LV function, no significant valvulopathy.  At the present time patient is stable, no CP, SOB orthopnea or PND.   All tests have returned negative for possible ACS.  Will discharge home with indication to see Cardio ambulatory.

## 2021-07-11 ENCOUNTER — APPOINTMENT (OUTPATIENT)
Dept: CT IMAGING | Age: 86
DRG: 084 | End: 2021-07-11
Attending: EMERGENCY MEDICINE

## 2021-07-11 ENCOUNTER — APPOINTMENT (OUTPATIENT)
Dept: GENERAL RADIOLOGY | Age: 86
DRG: 084 | End: 2021-07-11

## 2021-07-11 ENCOUNTER — APPOINTMENT (OUTPATIENT)
Dept: CT IMAGING | Age: 86
DRG: 084 | End: 2021-07-11
Attending: PHYSICIAN ASSISTANT

## 2021-07-11 ENCOUNTER — HOSPITAL ENCOUNTER (INPATIENT)
Age: 86
LOS: 1 days | Discharge: HOME HEALTH | DRG: 084 | End: 2021-07-12
Attending: EMERGENCY MEDICINE

## 2021-07-11 DIAGNOSIS — S51.012A SKIN TEAR OF LEFT ELBOW WITHOUT COMPLICATION, INITIAL ENCOUNTER: ICD-10-CM

## 2021-07-11 DIAGNOSIS — S01.81XA FACIAL LACERATION, INITIAL ENCOUNTER: ICD-10-CM

## 2021-07-11 DIAGNOSIS — S06.5XAA SUBDURAL HEMATOMA (CMD): Primary | ICD-10-CM

## 2021-07-11 LAB
ANION GAP SERPL CALC-SCNC: 15 MMOL/L (ref 10–20)
APTT PPP: 22 SEC (ref 22–30)
BASOPHILS # BLD: 0.1 K/MCL (ref 0–0.3)
BASOPHILS NFR BLD: 1 %
BUN SERPL-MCNC: 37 MG/DL (ref 6–20)
BUN/CREAT SERPL: 28 (ref 7–25)
CALCIUM SERPL-MCNC: 8.3 MG/DL (ref 8.4–10.2)
CHLORIDE SERPL-SCNC: 112 MMOL/L (ref 98–107)
CK SERPL-CCNC: 187 UNITS/L (ref 39–308)
CO2 SERPL-SCNC: 22 MMOL/L (ref 21–32)
CREAT SERPL-MCNC: 1.3 MG/DL (ref 0.67–1.17)
DEPRECATED RDW RBC: 49.4 FL (ref 39–50)
EOSINOPHIL # BLD: 0.2 K/MCL (ref 0–0.5)
EOSINOPHIL NFR BLD: 1 %
ERYTHROCYTE [DISTWIDTH] IN BLOOD: 13.5 % (ref 11–15)
FASTING DURATION TIME PATIENT: ABNORMAL H
GFR SERPLBLD BASED ON 1.73 SQ M-ARVRAT: 50 ML/MIN/1.73M2
GLUCOSE BLDC GLUCOMTR-MCNC: 78 MG/DL (ref 70–99)
GLUCOSE SERPL-MCNC: 148 MG/DL (ref 65–99)
HCT VFR BLD CALC: 28.4 % (ref 39–51)
HGB BLD-MCNC: 8.9 G/DL (ref 13–17)
IMM GRANULOCYTES # BLD AUTO: 0.7 K/MCL (ref 0–0.2)
IMM GRANULOCYTES # BLD: 3 %
INR PPP: 1.1
LYMPHOCYTES # BLD: 1.9 K/MCL (ref 1–4)
LYMPHOCYTES NFR BLD: 9 %
MAGNESIUM SERPL-MCNC: 2 MG/DL (ref 1.7–2.4)
MCH RBC QN AUTO: 31.3 PG (ref 26–34)
MCHC RBC AUTO-ENTMCNC: 31.3 G/DL (ref 32–36.5)
MCV RBC AUTO: 100 FL (ref 78–100)
MONOCYTES # BLD: 1.5 K/MCL (ref 0.3–0.9)
MONOCYTES NFR BLD: 7 %
NEUTROPHILS # BLD: 17 K/MCL (ref 1.8–7.7)
NEUTROPHILS NFR BLD: 79 %
NRBC BLD MANUAL-RTO: 0 /100 WBC
PLATELET # BLD AUTO: 271 K/MCL (ref 140–450)
POTASSIUM SERPL-SCNC: 4.4 MMOL/L (ref 3.4–5.1)
PROTHROMBIN TIME: 12 SEC (ref 9.7–11.8)
RAINBOW EXTRA TUBES HOLD SPECIMEN: NORMAL
RBC # BLD: 2.84 MIL/MCL (ref 4.5–5.9)
SODIUM SERPL-SCNC: 145 MMOL/L (ref 135–145)
TROPONIN I SERPL HS-MCNC: <0.02 NG/ML
WBC # BLD: 21.4 K/MCL (ref 4.2–11)

## 2021-07-11 PROCEDURE — 12053 INTMD RPR FACE/MM 5.1-7.5 CM: CPT | Performed by: EMERGENCY MEDICINE

## 2021-07-11 PROCEDURE — G1004 CDSM NDSC: HCPCS

## 2021-07-11 PROCEDURE — 99232 SBSQ HOSP IP/OBS MODERATE 35: CPT | Performed by: PHYSICIAN ASSISTANT

## 2021-07-11 PROCEDURE — 93041 RHYTHM ECG TRACING: CPT

## 2021-07-11 PROCEDURE — 85610 PROTHROMBIN TIME: CPT | Performed by: PHYSICIAN ASSISTANT

## 2021-07-11 PROCEDURE — 70450 CT HEAD/BRAIN W/O DYE: CPT

## 2021-07-11 PROCEDURE — 0HQ1XZZ REPAIR FACE SKIN, EXTERNAL APPROACH: ICD-10-PCS | Performed by: EMERGENCY MEDICINE

## 2021-07-11 PROCEDURE — 10002803 HB RX 637

## 2021-07-11 PROCEDURE — 99291 CRITICAL CARE FIRST HOUR: CPT

## 2021-07-11 PROCEDURE — 93005 ELECTROCARDIOGRAM TRACING: CPT | Performed by: EMERGENCY MEDICINE

## 2021-07-11 PROCEDURE — 72125 CT NECK SPINE W/O DYE: CPT

## 2021-07-11 PROCEDURE — 12053 INTMD RPR FACE/MM 5.1-7.5 CM: CPT

## 2021-07-11 PROCEDURE — 10002019 HB COUNTER RESP ASSESSMENT

## 2021-07-11 PROCEDURE — 71100 X-RAY EXAM RIBS UNI 2 VIEWS: CPT

## 2021-07-11 PROCEDURE — 83735 ASSAY OF MAGNESIUM: CPT | Performed by: EMERGENCY MEDICINE

## 2021-07-11 PROCEDURE — 85025 COMPLETE CBC W/AUTO DIFF WBC: CPT | Performed by: EMERGENCY MEDICINE

## 2021-07-11 PROCEDURE — 90471 IMMUNIZATION ADMIN: CPT | Performed by: EMERGENCY MEDICINE

## 2021-07-11 PROCEDURE — 13003289 HB OXYGEN THERAPY DAILY

## 2021-07-11 PROCEDURE — 70486 CT MAXILLOFACIAL W/O DYE: CPT

## 2021-07-11 PROCEDURE — 84484 ASSAY OF TROPONIN QUANT: CPT | Performed by: EMERGENCY MEDICINE

## 2021-07-11 PROCEDURE — 90715 TDAP VACCINE 7 YRS/> IM: CPT | Performed by: EMERGENCY MEDICINE

## 2021-07-11 PROCEDURE — 10002800 HB RX 250 W HCPCS: Performed by: EMERGENCY MEDICINE

## 2021-07-11 PROCEDURE — 10004651 HB RX, NO CHARGE ITEM

## 2021-07-11 PROCEDURE — 10002807 HB RX 258

## 2021-07-11 PROCEDURE — 10004281 HB COUNTER-STAFF TIME PER 15 MIN

## 2021-07-11 PROCEDURE — 80048 BASIC METABOLIC PNL TOTAL CA: CPT | Performed by: EMERGENCY MEDICINE

## 2021-07-11 PROCEDURE — 85730 THROMBOPLASTIN TIME PARTIAL: CPT | Performed by: PHYSICIAN ASSISTANT

## 2021-07-11 PROCEDURE — 10000008 HB ROOM CHARGE ICU OR CCU

## 2021-07-11 PROCEDURE — 36415 COLL VENOUS BLD VENIPUNCTURE: CPT

## 2021-07-11 PROCEDURE — 82550 ASSAY OF CK (CPK): CPT | Performed by: EMERGENCY MEDICINE

## 2021-07-11 RX ORDER — TAMSULOSIN HYDROCHLORIDE 0.4 MG/1
0.4 CAPSULE ORAL
COMMUNITY

## 2021-07-11 RX ORDER — LISINOPRIL 5 MG/1
5 TABLET ORAL DAILY
COMMUNITY

## 2021-07-11 RX ORDER — ATORVASTATIN CALCIUM 20 MG/1
20 TABLET, FILM COATED ORAL NIGHTLY
COMMUNITY

## 2021-07-11 RX ORDER — DIVALPROEX SODIUM 250 MG/1
250 TABLET, DELAYED RELEASE ORAL 3 TIMES DAILY
COMMUNITY

## 2021-07-11 RX ORDER — LANOLIN ALCOHOL/MO/W.PET/CERES
3 CREAM (GRAM) TOPICAL NIGHTLY
COMMUNITY

## 2021-07-11 RX ORDER — BISACODYL 10 MG
10 SUPPOSITORY, RECTAL RECTAL DAILY PRN
Status: DISCONTINUED | OUTPATIENT
Start: 2021-07-11 | End: 2021-07-12 | Stop reason: HOSPADM

## 2021-07-11 RX ORDER — 0.9 % SODIUM CHLORIDE 0.9 %
2 VIAL (ML) INJECTION EVERY 12 HOURS SCHEDULED
Status: DISCONTINUED | OUTPATIENT
Start: 2021-07-11 | End: 2021-07-12 | Stop reason: HOSPADM

## 2021-07-11 RX ORDER — OXYCODONE HYDROCHLORIDE AND ACETAMINOPHEN 5; 325 MG/1; MG/1
1 TABLET ORAL EVERY 4 HOURS PRN
Status: DISCONTINUED | OUTPATIENT
Start: 2021-07-11 | End: 2021-07-12

## 2021-07-11 RX ORDER — HYDRALAZINE HYDROCHLORIDE 20 MG/ML
10 INJECTION INTRAMUSCULAR; INTRAVENOUS EVERY 6 HOURS PRN
Status: DISCONTINUED | OUTPATIENT
Start: 2021-07-11 | End: 2021-07-12 | Stop reason: HOSPADM

## 2021-07-11 RX ORDER — AMOXICILLIN 250 MG
2 CAPSULE ORAL DAILY PRN
Status: DISCONTINUED | OUTPATIENT
Start: 2021-07-11 | End: 2021-07-12 | Stop reason: HOSPADM

## 2021-07-11 RX ORDER — SODIUM CHLORIDE 9 MG/ML
INJECTION, SOLUTION INTRAVENOUS CONTINUOUS PRN
Status: DISCONTINUED | OUTPATIENT
Start: 2021-07-11 | End: 2021-07-12 | Stop reason: HOSPADM

## 2021-07-11 RX ORDER — ALBUTEROL SULFATE 2.5 MG/3ML
2.5 SOLUTION RESPIRATORY (INHALATION)
Status: DISCONTINUED | OUTPATIENT
Start: 2021-07-11 | End: 2021-07-12 | Stop reason: HOSPADM

## 2021-07-11 RX ORDER — ACETAMINOPHEN 325 MG/1
650 TABLET ORAL EVERY 4 HOURS PRN
Status: DISCONTINUED | OUTPATIENT
Start: 2021-07-11 | End: 2021-07-12 | Stop reason: HOSPADM

## 2021-07-11 RX ORDER — SODIUM CHLORIDE 9 MG/ML
INJECTION, SOLUTION INTRAVENOUS CONTINUOUS
Status: DISCONTINUED | OUTPATIENT
Start: 2021-07-11 | End: 2021-07-12 | Stop reason: HOSPADM

## 2021-07-11 RX ORDER — QUETIAPINE FUMARATE 50 MG/1
50 TABLET, FILM COATED ORAL 3 TIMES DAILY
COMMUNITY

## 2021-07-11 RX ORDER — ACETAMINOPHEN 500 MG
500 TABLET ORAL 3 TIMES DAILY PRN
COMMUNITY

## 2021-07-11 RX ORDER — ONDANSETRON 2 MG/ML
4 INJECTION INTRAMUSCULAR; INTRAVENOUS 2 TIMES DAILY PRN
Status: DISCONTINUED | OUTPATIENT
Start: 2021-07-11 | End: 2021-07-12 | Stop reason: HOSPADM

## 2021-07-11 RX ORDER — MEMANTINE HYDROCHLORIDE 10 MG/1
10 TABLET ORAL 2 TIMES DAILY
COMMUNITY

## 2021-07-11 RX ORDER — BUDESONIDE 9 MG/1
9 CAPSULE ORAL DAILY
COMMUNITY

## 2021-07-11 RX ORDER — ALBUTEROL SULFATE 90 UG/1
2 AEROSOL, METERED RESPIRATORY (INHALATION)
Status: DISCONTINUED | OUTPATIENT
Start: 2021-07-11 | End: 2021-07-11

## 2021-07-11 RX ORDER — DONEPEZIL HYDROCHLORIDE 10 MG/1
20 TABLET, FILM COATED ORAL NIGHTLY
COMMUNITY

## 2021-07-11 RX ORDER — POLYETHYLENE GLYCOL 3350 17 G/17G
17 POWDER, FOR SOLUTION ORAL DAILY PRN
Status: DISCONTINUED | OUTPATIENT
Start: 2021-07-11 | End: 2021-07-12 | Stop reason: HOSPADM

## 2021-07-11 RX ORDER — FINASTERIDE 5 MG/1
5 TABLET, FILM COATED ORAL DAILY
COMMUNITY

## 2021-07-11 RX ORDER — PAROXETINE HYDROCHLORIDE 40 MG/1
40 TABLET, FILM COATED ORAL EVERY MORNING
COMMUNITY

## 2021-07-11 RX ADMIN — SODIUM CHLORIDE, PRESERVATIVE FREE 2 ML: 5 INJECTION INTRAVENOUS at 11:05

## 2021-07-11 RX ADMIN — OXYCODONE HYDROCHLORIDE AND ACETAMINOPHEN 1 TABLET: 5; 325 TABLET ORAL at 16:07

## 2021-07-11 RX ADMIN — SODIUM CHLORIDE, PRESERVATIVE FREE 2 ML: 5 INJECTION INTRAVENOUS at 20:06

## 2021-07-11 RX ADMIN — SODIUM CHLORIDE: 0.9 INJECTION, SOLUTION INTRAVENOUS at 20:49

## 2021-07-11 RX ADMIN — OXYCODONE HYDROCHLORIDE AND ACETAMINOPHEN 1 TABLET: 5; 325 TABLET ORAL at 23:14

## 2021-07-11 RX ADMIN — SODIUM CHLORIDE: 0.9 INJECTION, SOLUTION INTRAVENOUS at 10:28

## 2021-07-11 RX ADMIN — TETANUS TOXOID, REDUCED DIPHTHERIA TOXOID AND ACELLULAR PERTUSSIS VACCINE, ADSORBED 0.5 ML: 5; 2.5; 8; 8; 2.5 SUSPENSION INTRAMUSCULAR at 07:29

## 2021-07-11 RX ADMIN — ACETAMINOPHEN 650 MG: 325 TABLET ORAL at 11:02

## 2021-07-11 ASSESSMENT — PAIN SCALES - PAIN ASSESSMENT IN ADVANCED DEMENTIA (PAINAD)
BODYLANGUAGE: RELAXED
FACIALEXPRESSION: SMILING OR INEXPRESSIVE
BODYLANGUAGE: RELAXED
CONSOLABILITY: DISTRACTED OR REASSURED BY VOICE OR TOUCH
NEGVOCALIZATION: OCCASIONAL MOAN OR GROAN, LOW LEVELS OF SPEECH WITH A NEGATIVE OR DISAPPROVING QUALITY
TOTALSCORE: 0
BREATHING: NORMAL
BODYLANGUAGE: TENSE, DISTRESSED, FIDGETING
BREATHING: NORMAL
CONSOLABILITY: NO NEED TO CONSOLE
BODYLANGUAGE: TENSE, DISTRESSED, FIDGETING
CONSOLABILITY: NO NEED TO CONSOLE
NEGVOCALIZATION: REPEATED TROUBLED CALLING OUT. LOUD MOANING OR GROANING. CRYING
FACIALEXPRESSION: SMILING OR INEXPRESSIVE
FACIALEXPRESSION: SMILING OR INEXPRESSIVE
FACIALEXPRESSION: FACIAL GRIMACING
TOTALSCORE: 6
BREATHING: NORMAL
NEGVOCALIZATION: REPEATED TROUBLED CALLING OUT. LOUD MOANING OR GROANING. CRYING
NEGVOCALIZATION: OCCASIONAL MOAN OR GROAN, LOW LEVELS OF SPEECH WITH A NEGATIVE OR DISAPPROVING QUALITY
TOTALSCORE: 1
FACIALEXPRESSION: SMILING OR INEXPRESSIVE
CONSOLABILITY: NO NEED TO CONSOLE
FACIALEXPRESSION: SMILING OR INEXPRESSIVE
TOTALSCORE: 6
BREATHING: NORMAL
NEGVOCALIZATION: OCCASIONAL MOAN OR GROAN, LOW LEVELS OF SPEECH WITH A NEGATIVE OR DISAPPROVING QUALITY
BODYLANGUAGE: RELAXED
CONSOLABILITY: DISTRACTED OR REASSURED BY VOICE OR TOUCH
CONSOLABILITY: NO NEED TO CONSOLE
TOTALSCORE: 1
FACIALEXPRESSION: SMILING OR INEXPRESSIVE
NEGVOCALIZATION: OCCASIONAL MOAN OR GROAN, LOW LEVELS OF SPEECH WITH A NEGATIVE OR DISAPPROVING QUALITY
BREATHING: NORMAL
BODYLANGUAGE: RELAXED
FACIALEXPRESSION: SMILING OR INEXPRESSIVE
BREATHING: NORMAL
BODYLANGUAGE: RELAXED
TOTALSCORE: 0
BREATHING: NORMAL
TOTALSCORE: 2
BREATHING: NORMAL
TOTALSCORE: 0
TOTALSCORE: 1
BODYLANGUAGE: TENSE, DISTRESSED, FIDGETING
FACIALEXPRESSION: FACIAL GRIMACING
BODYLANGUAGE: RELAXED
BREATHING: NORMAL
CONSOLABILITY: NO NEED TO CONSOLE

## 2021-07-11 ASSESSMENT — PULMONARY FUNCTION TESTS: FEV1/FVC: UNABLE TO OBTAIN, OR GREATER THAN 70%

## 2021-07-11 ASSESSMENT — PAIN SCALES - BEHAVIORAL PAIN SCALE (BPS)
BPS_SCORE: 5
BPS_SCORE: 3

## 2021-07-12 ENCOUNTER — APPOINTMENT (OUTPATIENT)
Dept: CT IMAGING | Age: 86
DRG: 084 | End: 2021-07-12
Attending: PHYSICIAN ASSISTANT

## 2021-07-12 VITALS
BODY MASS INDEX: 23.13 KG/M2 | DIASTOLIC BLOOD PRESSURE: 64 MMHG | SYSTOLIC BLOOD PRESSURE: 119 MMHG | OXYGEN SATURATION: 97 % | RESPIRATION RATE: 17 BRPM | HEIGHT: 70 IN | TEMPERATURE: 97.5 F | HEART RATE: 75 BPM | WEIGHT: 161.6 LBS
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
## 2021-07-12 LAB
ABO + RH BLD: NORMAL
ANION GAP SERPL CALC-SCNC: 10 MMOL/L (ref 10–20)
ATRIAL RATE (BPM): 83
BLD GP AB SCN SERPL QL GEL: NEGATIVE
BLOOD EXPIRATION DATE: NORMAL
BUN SERPL-MCNC: 37 MG/DL (ref 6–20)
BUN/CREAT SERPL: 32 (ref 7–25)
CALCIUM SERPL-MCNC: 7.8 MG/DL (ref 8.4–10.2)
CHLORIDE SERPL-SCNC: 115 MMOL/L (ref 98–107)
CO2 SERPL-SCNC: 25 MMOL/L (ref 21–32)
CREAT SERPL-MCNC: 1.17 MG/DL (ref 0.67–1.17)
CROSSMATCH RESULT: NORMAL
DEPRECATED RDW RBC: 49.5 FL (ref 39–50)
DISPENSE STATUS: NORMAL
ERYTHROCYTE [DISTWIDTH] IN BLOOD: 13.8 % (ref 11–15)
FASTING DURATION TIME PATIENT: ABNORMAL H
GFR SERPLBLD BASED ON 1.73 SQ M-ARVRAT: 57 ML/MIN/1.73M2
GLUCOSE SERPL-MCNC: 88 MG/DL (ref 65–99)
HCT VFR BLD CALC: 20.9 % (ref 39–51)
HGB BLD-MCNC: 6.6 G/DL (ref 13–17)
HGB BLD-MCNC: 8.1 G/DL (ref 13–17)
ISBT BLOOD TYPE: 6200
ISSUE DATE/TIME: NORMAL
MAGNESIUM SERPL-MCNC: 2.1 MG/DL (ref 1.7–2.4)
MCH RBC QN AUTO: 31.3 PG (ref 26–34)
MCHC RBC AUTO-ENTMCNC: 31.6 G/DL (ref 32–36.5)
MCV RBC AUTO: 99.1 FL (ref 78–100)
NRBC BLD MANUAL-RTO: 0 /100 WBC
P AXIS (DEGREES): 80
PHOSPHATE SERPL-MCNC: 3.1 MG/DL (ref 2.4–4.7)
PLATELET # BLD AUTO: 151 K/MCL (ref 140–450)
POTASSIUM SERPL-SCNC: 3.9 MMOL/L (ref 3.4–5.1)
PR-INTERVAL (MSEC): 150
PRODUCT CODE: NORMAL
PRODUCT DESCRIPTION: NORMAL
PRODUCT ID: NORMAL
QRS-INTERVAL (MSEC): 84
QT-INTERVAL (MSEC): 400
QTC: 470
R AXIS (DEGREES): 56
RBC # BLD: 2.11 MIL/MCL (ref 4.5–5.9)
REPORT TEXT: NORMAL
SODIUM SERPL-SCNC: 146 MMOL/L (ref 135–145)
T AXIS (DEGREES): 83
TYPE AND SCREEN EXPIRATION DATE: NORMAL
UNIT BLOOD TYPE: NORMAL
UNIT NUMBER: NORMAL
VENTRICULAR RATE EKG/MIN (BPM): 83
WBC # BLD: 8.3 K/MCL (ref 4.2–11)

## 2021-07-12 PROCEDURE — 97162 PT EVAL MOD COMPLEX 30 MIN: CPT

## 2021-07-12 PROCEDURE — 10002803 HB RX 637: Performed by: SURGERY

## 2021-07-12 PROCEDURE — G1004 CDSM NDSC: HCPCS

## 2021-07-12 PROCEDURE — 86901 BLOOD TYPING SEROLOGIC RH(D): CPT | Performed by: SURGERY

## 2021-07-12 PROCEDURE — 85027 COMPLETE CBC AUTOMATED: CPT

## 2021-07-12 PROCEDURE — 84100 ASSAY OF PHOSPHORUS: CPT

## 2021-07-12 PROCEDURE — 83735 ASSAY OF MAGNESIUM: CPT

## 2021-07-12 PROCEDURE — 13001086 HB INCENTIVE SPIROMETER W INSTRUCT

## 2021-07-12 PROCEDURE — 85018 HEMOGLOBIN: CPT | Performed by: SURGERY

## 2021-07-12 PROCEDURE — 36415 COLL VENOUS BLD VENIPUNCTURE: CPT

## 2021-07-12 PROCEDURE — 10002016 HB COUNTER INCENTIVE SPIROMETRY

## 2021-07-12 PROCEDURE — 10004281 HB COUNTER-STAFF TIME PER 15 MIN

## 2021-07-12 PROCEDURE — 99233 SBSQ HOSP IP/OBS HIGH 50: CPT | Performed by: NURSE PRACTITIONER

## 2021-07-12 PROCEDURE — 99291 CRITICAL CARE FIRST HOUR: CPT | Performed by: SURGERY

## 2021-07-12 PROCEDURE — 10004651 HB RX, NO CHARGE ITEM

## 2021-07-12 PROCEDURE — 80048 BASIC METABOLIC PNL TOTAL CA: CPT

## 2021-07-12 PROCEDURE — P9016 RBC LEUKOCYTES REDUCED: HCPCS

## 2021-07-12 PROCEDURE — 70450 CT HEAD/BRAIN W/O DYE: CPT

## 2021-07-12 RX ORDER — LANOLIN ALCOHOL/MO/W.PET/CERES
3 CREAM (GRAM) TOPICAL NIGHTLY
Status: DISCONTINUED | OUTPATIENT
Start: 2021-07-12 | End: 2021-07-12 | Stop reason: HOSPADM

## 2021-07-12 RX ORDER — PAROXETINE HYDROCHLORIDE 40 MG/1
40 TABLET, FILM COATED ORAL EVERY MORNING
Status: DISCONTINUED | OUTPATIENT
Start: 2021-07-12 | End: 2021-07-12 | Stop reason: HOSPADM

## 2021-07-12 RX ORDER — LISINOPRIL 2.5 MG/1
5 TABLET ORAL DAILY
Status: DISCONTINUED | OUTPATIENT
Start: 2021-07-12 | End: 2021-07-12 | Stop reason: HOSPADM

## 2021-07-12 RX ORDER — FINASTERIDE 5 MG/1
5 TABLET, FILM COATED ORAL DAILY
Status: DISCONTINUED | OUTPATIENT
Start: 2021-07-12 | End: 2021-07-12 | Stop reason: HOSPADM

## 2021-07-12 RX ORDER — DIVALPROEX SODIUM 125 MG/1
250 TABLET, DELAYED RELEASE ORAL 3 TIMES DAILY
Status: DISCONTINUED | OUTPATIENT
Start: 2021-07-12 | End: 2021-07-12 | Stop reason: HOSPADM

## 2021-07-12 RX ORDER — ATORVASTATIN CALCIUM 20 MG/1
20 TABLET, FILM COATED ORAL NIGHTLY
Status: DISCONTINUED | OUTPATIENT
Start: 2021-07-12 | End: 2021-07-12 | Stop reason: HOSPADM

## 2021-07-12 RX ORDER — BUDESONIDE 3 MG/1
9 CAPSULE, COATED PELLETS ORAL EVERY MORNING
Status: DISCONTINUED | OUTPATIENT
Start: 2021-07-12 | End: 2021-07-12 | Stop reason: HOSPADM

## 2021-07-12 RX ORDER — POTASSIUM CHLORIDE 20 MEQ/1
40 TABLET, EXTENDED RELEASE ORAL ONCE
Status: DISCONTINUED | OUTPATIENT
Start: 2021-07-12 | End: 2021-07-12

## 2021-07-12 RX ORDER — TAMSULOSIN HYDROCHLORIDE 0.4 MG/1
0.4 CAPSULE ORAL
Status: DISCONTINUED | OUTPATIENT
Start: 2021-07-12 | End: 2021-07-12 | Stop reason: HOSPADM

## 2021-07-12 RX ORDER — SODIUM CHLORIDE 9 MG/ML
INJECTION, SOLUTION INTRAVENOUS CONTINUOUS PRN
Status: DISCONTINUED | OUTPATIENT
Start: 2021-07-12 | End: 2021-07-12 | Stop reason: HOSPADM

## 2021-07-12 RX ORDER — MEMANTINE HYDROCHLORIDE 10 MG/1
10 TABLET ORAL 2 TIMES DAILY
Status: DISCONTINUED | OUTPATIENT
Start: 2021-07-12 | End: 2021-07-12 | Stop reason: HOSPADM

## 2021-07-12 RX ORDER — QUETIAPINE FUMARATE 25 MG/1
50 TABLET, FILM COATED ORAL 3 TIMES DAILY
Status: DISCONTINUED | OUTPATIENT
Start: 2021-07-12 | End: 2021-07-12 | Stop reason: HOSPADM

## 2021-07-12 RX ORDER — POTASSIUM CHLORIDE 20 MEQ/1
40 TABLET, EXTENDED RELEASE ORAL ONCE
Status: COMPLETED | OUTPATIENT
Start: 2021-07-12 | End: 2021-07-12

## 2021-07-12 RX ORDER — DONEPEZIL HYDROCHLORIDE 10 MG/1
20 TABLET, FILM COATED ORAL NIGHTLY
Status: DISCONTINUED | OUTPATIENT
Start: 2021-07-12 | End: 2021-07-12 | Stop reason: HOSPADM

## 2021-07-12 RX ADMIN — PAROXETINE 40 MG: 40 TABLET, FILM COATED ORAL at 13:34

## 2021-07-12 RX ADMIN — FINASTERIDE 5 MG: 5 TABLET, FILM COATED ORAL at 10:18

## 2021-07-12 RX ADMIN — QUETIAPINE 50 MG: 25 TABLET, FILM COATED ORAL at 13:32

## 2021-07-12 RX ADMIN — POTASSIUM CHLORIDE 40 MEQ: 1500 TABLET, EXTENDED RELEASE ORAL at 10:43

## 2021-07-12 RX ADMIN — MEMANTINE HYDROCHLORIDE 10 MG: 10 TABLET, FILM COATED ORAL at 13:32

## 2021-07-12 RX ADMIN — SODIUM CHLORIDE, PRESERVATIVE FREE 2 ML: 5 INJECTION INTRAVENOUS at 08:41

## 2021-07-12 RX ADMIN — PAROXETINE 40 MG: 40 TABLET, FILM COATED ORAL at 13:33

## 2021-07-12 RX ADMIN — BUDESONIDE 9 MG: 3 CAPSULE ORAL at 10:18

## 2021-07-12 RX ADMIN — DIVALPROEX SODIUM 250 MG: 125 TABLET, DELAYED RELEASE ORAL at 10:17

## 2021-07-12 RX ADMIN — QUETIAPINE 50 MG: 25 TABLET, FILM COATED ORAL at 10:17

## 2021-07-12 RX ADMIN — LISINOPRIL 5 MG: 2.5 TABLET ORAL at 10:18

## 2021-07-12 RX ADMIN — TAMSULOSIN HYDROCHLORIDE 0.4 MG: 0.4 CAPSULE ORAL at 17:24

## 2021-07-12 RX ADMIN — DIVALPROEX SODIUM 250 MG: 125 TABLET, DELAYED RELEASE ORAL at 13:32

## 2021-07-12 ASSESSMENT — PAIN SCALES - PAIN ASSESSMENT IN ADVANCED DEMENTIA (PAINAD)
BODYLANGUAGE: RELAXED
CONSOLABILITY: NO NEED TO CONSOLE
BREATHING: NORMAL
TOTALSCORE: 0
FACIALEXPRESSION: SMILING OR INEXPRESSIVE
BODYLANGUAGE: RELAXED
CONSOLABILITY: NO NEED TO CONSOLE
FACIALEXPRESSION: SMILING OR INEXPRESSIVE
TOTALSCORE: 0
BREATHING: NORMAL

## 2021-07-12 ASSESSMENT — PAIN SCALES - BEHAVIORAL PAIN SCALE (BPS)
BPS_SCORE: 3
BPS_SCORE: 3

## 2021-07-12 ASSESSMENT — COGNITIVE AND FUNCTIONAL STATUS - GENERAL
BASIC_MOBILITY_CONVERTED_SCORE: 16.59
BASIC_MOBILITY_RAW_SCORE: 6

## 2021-07-12 ASSESSMENT — ACTIVITIES OF DAILY LIVING (ADL): EATING: SUPERVISION (SUPV)

## 2021-07-13 ENCOUNTER — TELEPHONE (OUTPATIENT)
Dept: NEUROSURGERY | Age: 86
End: 2021-07-13

## 2021-07-13 DIAGNOSIS — S06.5XAA SDH (SUBDURAL HEMATOMA) (CMD): Primary | ICD-10-CM

## 2021-07-14 ENCOUNTER — ADVANCED DIRECTIVES (OUTPATIENT)
Dept: HEALTH INFORMATION MANAGEMENT | Age: 86
End: 2021-07-14

## 2021-07-26 ENCOUNTER — TELEPHONE (OUTPATIENT)
Dept: NEUROSURGERY | Age: 86
End: 2021-07-26

## 2021-07-26 ENCOUNTER — HOSPITAL ENCOUNTER (OUTPATIENT)
Dept: CT IMAGING | Age: 86
Discharge: HOME | End: 2021-07-26
Attending: PHYSICIAN ASSISTANT

## 2021-07-26 DIAGNOSIS — S06.5XAA SDH (SUBDURAL HEMATOMA) (CMD): ICD-10-CM

## 2021-07-26 PROCEDURE — G1004 CDSM NDSC: HCPCS

## 2021-07-26 PROCEDURE — 70450 CT HEAD/BRAIN W/O DYE: CPT

## 2021-07-28 ENCOUNTER — OFFICE VISIT (OUTPATIENT)
Dept: NEUROSURGERY | Age: 86
End: 2021-07-28

## 2021-07-28 VITALS — RESPIRATION RATE: 20 BRPM | HEART RATE: 96 BPM | SYSTOLIC BLOOD PRESSURE: 103 MMHG | DIASTOLIC BLOOD PRESSURE: 66 MMHG

## 2021-07-28 DIAGNOSIS — S06.360D TRAUMATIC HEMORRHAGE OF CEREBRUM WITHOUT LOSS OF CONSCIOUSNESS, UNSPECIFIED LATERALITY, SUBSEQUENT ENCOUNTER: Primary | ICD-10-CM

## 2021-07-28 PROCEDURE — 99213 OFFICE O/P EST LOW 20 MIN: CPT | Performed by: NEUROLOGICAL SURGERY

## 2021-08-04 ENCOUNTER — TELEPHONE (OUTPATIENT)
Dept: NEUROSURGERY | Age: 86
End: 2021-08-04

## 2021-08-04 ENCOUNTER — APPOINTMENT (OUTPATIENT)
Dept: NEUROSURGERY | Age: 86
End: 2021-08-04

## 2022-06-01 NOTE — LETTER
Date: 2019  Patient Name:  Allie Ngo             Address:  [facility] [address]    : 1935    Dear Mr. Sai Mendez,    On your recent colonoscopy, all of the biopsies did reveal active Ulcerative colitis.   However, the biops 02-Jun-2022

## 2024-01-19 NOTE — TELEPHONE ENCOUNTER
Caller: patient    Doctor: Юлия    Reason for call: pt called asking if he can get a letter stating when his sx is.  Call when completed    Call back#: [phone]   PA done over [employer], await decision.

## (undated) DEVICE — DRAPE WARMER ORS-300

## (undated) DEVICE — ENDOSCOPY PACK - LOWER: Brand: MEDLINE INDUSTRIES, INC.

## (undated) DEVICE — Device: Brand: DEFENDO AIR/WATER/SUCTION AND BIOPSY VALVE

## (undated) DEVICE — 1200CC GUARDIAN II: Brand: GUARDIAN

## (undated) DEVICE — 3M™ RED DOT™ MONITORING ELECTRODE WITH FOAM TAPE AND STICKY GEL, 50/BAG, 20/CASE, 72/PLT 2570: Brand: RED DOT™

## (undated) DEVICE — FORCEP RADIAL JAW 4

## (undated) DEVICE — FILTERLINE NASAL ADULT O2/CO2

## (undated) DEVICE — "MH-948 A/W CHANNEL CLEANING ADPTR -VIDEO": Brand: AW CHANNEL CLEANING ADAPTE

## (undated) DEVICE — SYRINGE 50ML LL TIP

## (undated) DEVICE — NEEDLE CONTRAST INTERJECT 25G

## (undated) DEVICE — Device: Brand: SPOT EX ENDOSCOPIC TATTOO

## (undated) DEVICE — FORCEP BIOPSY RJ4 LG CAP W/ND

## (undated) NOTE — MR AVS SNAPSHOT
[facility] [place]  [address]  [phone]               Thank you for choosing us for your health care visit with Álvaro Quigley NP. We are glad to serve you and happy to provide you with this summary of your visit.   Ple substitute for professional medical care. Always follow your healthcare professional's instructions.              Your Appointments     Mar 16, 2017  1:40 PM   FOLLOW UP with Meet Hallman MD   SP CARDIOLOGY (Joaquina Augustine)    [address] [place] [address] TAKE 1 TABLET(30 MG) BY MOUTH EVERY DAY   Commonly known as:  PAXIL           Promethazine HCl 25 MG Tabs   Take 25 mg by mouth every 6 (six) hours as needed for Nausea.    Commonly known as:  PHENERGAN           Rosuvastatin Calcium 5 MG Tabs   TAKE 1 TABL

## (undated) NOTE — MR AVS SNAPSHOT
[address]  [address] [address] [phone]               Thank you for choosing us for your health care visit with Reema Rodriguez DO.   We are glad to serve you and happy to provide you with this Take 1 tablet (23 mg total) by mouth nightly. finasteride 5 MG Tabs   TAKE 1 TABLET BY MOUTH EVERY DAY   Commonly known as:  PROSCAR           Levocetirizine Dihydrochloride 5 MG Tabs   Take 1 tablet (5 mg total) by mouth every evening.    Commonl

## (undated) NOTE — MR AVS SNAPSHOT
[address]  [address]37 [facility], [address] [address] [phone]               Thank you for choosing us for your health care visit with Candido Hogue DO.   We are glad to serve you and happy to provide you with this Commonly known as:  COREG           CENTRUM SILVER OR   Take  by mouth.            Donepezil HCl 10 MG Tabs   Commonly known as:  ARICEPT           finasteride 5 MG Tabs   TAKE 1 TABLET BY MOUTH EVERY DAY   Commonly known as:  Alesha Michael

## (undated) NOTE — MR AVS SNAPSHOT
[address]  [address] [address] [phone]               Thank you for choosing us for your health care visit with Amy Gomez DO.   We are glad to serve you and happy to provide you with this Take 1 capsule (300 mg total) by mouth 2 (two) times daily. Commonly known as:  OMNICEF           CENTRUM SILVER OR   Take  by mouth. Donepezil HCl 23 MG Tabs   Take 1 tablet (23 mg total) by mouth nightly.            finasteride 5 MG Tabs   [place] EAT THESE FOODS MORE OFTEN: EAT THESE FOODS LESS OFTEN:   Make half your plate fruits and vegetables Highly refined, white starches including white bread, rice and pasta   Eat plenty of protein, keep the fat content low Sugars:  sodas and sports drinks,

## (undated) NOTE — ED AVS SNAPSHOT
Iglesia Mccracken   MRN: [id number]    Department:  [facility] Emergency Department   Date of Visit:  1/18/2018           Disclosure     Insurance plans vary and the physician(s) referred by the ER may not be covered by your plan.  Please contact your tell this physician (or your personal doctor if your instructions are to return to your personal doctor) about any new or lasting problems. The primary care or specialist physician will see patients referred from the [facility] Emergency Department.  Eitan Noel

## (undated) NOTE — IP AVS SNAPSHOT
Patient Demographics     Address  [address] [facility] [address] Phone  [phone] [facility]) *Preferred* E-mail Address  [email or web address]      Emergency Contact(s)     Name Relation Home Work Mobile    Wendy Woo [address] [facility] Commonly known as:  PROSCAR      TAKE 1 TABLET BY MOUTH EVERY DAY   Macel Fears, DO         gabapentin 100 MG Caps  Commonly known as:  NEURONTIN      Take 1 capsule (100 mg total) by mouth nightly.    Brittney Cohen MD         HYDROcodone-acetaminophen ** SITE UNKNOWN **     Order ID Medication Name Action Time Action Reason Comments    [id number] Alfuzosin HCl ER (UROXATRAL) 24 hr tab 10 mg 10/20/18 0921 Given      [id number] Fluticasone Propionate (FLONASE) 50 MCG/ACT nasal spray 1 spray 10/20/18 0924 G Component Value Reference Range Flag Lab   Hold PERSON [facility] Resulted — — Jonathan Mccabe [[id number]]  Resulted: 10/20/18 0101, Result status: Final result   Ordering provider:  Davon Sosa MD  10/19/18 2008 Resulting lab:  ED Calcium, Total 9.4 8.3 - 10.3 mg/dL [facility]   Calculated Osmolality 300 275 - 295 mOsm/kg H [facility]   GFR, Non- 63 >=60 — [facility]   GFR, -American 73 >=60 — [facility]   Comment:           Estimated GFR units: mL/min/1.73 past medical history of cervical DJD, CAD s/p CABG, HTN, DL and dementia. He has had on going neck pain for several months. He has had acute worsening since Sunday. He was not able to get out of bed. He was brought to the ED on 10/17.   He had a CTA bra • PATIENT DOCUMENTED NOT TO HAVE EXPERIENCED ANY OF THE FOLLOWING EVENTS N/A 3/15/2016    Procedure: SEPTORHINOPLASTY WITH OSTEOTOMIES AND  GRAFTS;  Surgeon: Celestina Lindsey;   Location: [facility]   • PATIENT WITH PREOPERATIVE ORDER FOR IV ANTIBIOT FINASTERIDE 5 MG Oral Tab TAKE 1 TABLET BY MOUTH EVERY DAY Disp: 90 tablet Rfl: 0   TAMSULOSIN HCL 0.4 MG Oral Cap TAKE 1 CAPSULE BY MOUTH EVERY DAY Disp: 90 capsule Rfl: 0   mesalamine 1.2 g Oral Tab EC Take 2 tablets (2.4 g total) by mouth 2 (two) times Respiratory: Clear to auscultation bilaterally. No wheezes. No rhonchi. Cardiovascular: S1, S2. Regular rate and rhythm. No murmurs, no rubs or gallops. Equal pulses. Chest and Back: No tenderness or deformity. Abdomen: Soft, nontender, nondistended. 6. 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 UC  1. On meslamine  8. Dementia  9. Dispo  1. Wife unable to care for pt in his current condition. Will have PT and SW see.  May need short stay in rehab/NH until he improves to the point that wife can janene 1. Neck pain actually improved with norco but still persists  2. Will add low dose neurontin at night to help with sleep  3. Can consider changing remeron to duloxetine  4. Add zanaflex PRN given c/o neck spasms  5. Failed tramadol  6. Cont. norco  7.  OP C Lab/Test results pending at Discharge:   · none    Consultants:  ? none    Discharge Medication List:     Discharge Medications      START taking these medications      Instructions Prescription details   gabapentin 100 MG Caps  Commonly known as:  Brandi Hancock Refills:  1     Rosuvastatin Calcium 5 MG Tabs  Commonly known as:  CRESTOR      TAKE 1 TABLET BY MOUTH EVERY NIGHT   Quantity:  90 tablet  Refills:  0     tamsulosin HCl 0.4 MG Caps  Commonly known as:  FLOMAX      TAKE 1 CAPSULE BY MOUTH EVERY DAY   Sal Melchor Neurologic: No focal neurological deficits. No numbness tingling or weakness  Musculoskeletal: Moves all extremities. Extremities: No edema.   -----------------------------------------------------------------------------------------------  PATIENT DISCHARG Active Problems:    Other cervical disc degeneration, unspecified cervical region      Past Medical History  Past Medical History:   Diagnosis Date   • Acute bronchitis    • Acute sinusitis, unspecified    • Allergic rhinitis, cause unspecified    • Athero Procedure: SEPTORHINOPLASTY WITH OSTEOTOMIES AND  GRAFTS;  Surgeon: Angi Powell;   Location: [facility]   • SEPTORHINOPLASTY WITH OSTEOTOMIES AND  GRAFTS N/A 3/15/2016    Performed by Angi Powell at [address] [facility] -   Putting on and taking off regular upper body clothing?: None  -   Taking care of personal grooming such as brushing teeth?: None  -   Eating meals?: None    AM-PAC Score:  Score: 21  Approx Degree of Impairment: 32.79%  Standardized Score (AM-PAC Scale OT evaluation patient presents with the following performance deficits: decreased strength, endurance, functional mobility, and overall safety awareness.  These deficits impact the patient’s ability to participate in ADLs, instrumental activities of daily l Provider Ethel Silva    10/22/2018 2:40 PM Navya Taveras MD SP CARDIOLOGY [facility]    10/27/2018 11:15 AM (Arrive by 11:00 AM) Tamara Chamorro DO [address] [place] [address], [address] [place] [address] & B    4/5/2019 1:00 PM Lina Marroquin

## (undated) NOTE — MR AVS SNAPSHOT
[facility]  [address] [facility] [place]  [phone]               Thank you for choosing us for your health care visit with [facility] ELLA RASHID. We are glad to serve you and happy to provide you with this summary of your visit.   Please recent med list.                aspirin 81 MG Tabs   Take 1 tablet by mouth daily. carvedilol 3.125 MG Tabs   Take 1 tablet (3.125 mg total) by mouth 2 (two) times daily. Commonly known as:  COREG           CENTRUM SILVER OR   Take  by mouth.

## (undated) NOTE — MR AVS SNAPSHOT
[address]  [address] [address] [phone]               Thank you for choosing us for your health care visit with Reema Rodriguez DO.   We are glad to serve you and happy to provide you with this aspirin 81 MG Tabs   Take 1 tablet by mouth daily. carvedilol 3.125 MG Tabs   Take 1 tablet (3.125 mg total) by mouth 2 (two) times daily.    Commonly known as:  COREG           cefdinir 300 MG Caps   Take 1 capsule (300 mg total) by mouth 2 (two - ClonazePAM 0.5 MG Tabs            MyChart                  Visit [facility] online at  [email or web address]

## (undated) NOTE — ED AVS SNAPSHOT
[facility] Emergency Department in [address] [facility]    Phone:  [phone]    Fax:  [address]   MRN: [id number]    Department:  [facility] Emergency Department in [place]   Date of Visit: Admin Date Administration Dose                   04/08/2017  14:14 iohexol (OMNIPAQUE) 350 MG/ML injection 100 mL 100 mL                     Medication Information       Follow the directions for taking your medications provided by your doctor.  Please ask tell this physician (or your personal doctor if your instructions are to return to your personal doctor) about any new or lasting problems. The primary care or specialist physician will see patients referred from the [facility] Emergency Department.  Yogesh Roberts - If you are a smoker or have smoked in the last 12 months, we encourage you to explore options for quitting.     - If you have concerns related to behavioral health issues or thoughts of harming yourself, contact [facility] a PATIENT STATED HISTORY:(As transcribed by Technologist)  Mid epigastric region pain for 4-5days. No nausea,vomiting or diarrhea. CONTRAST USED:  80 CC  cc of Omnipaque 350     FINDINGS:    LIVER:  Multiple hepatic cysts again demonstrated.  Low attenu (ER), 4/08/2017, 14:11. INDICATIONS:  nausea, abd pain, sent from [facility]     TECHNIQUE:  Real time gray-scale ultrasound was used to evaluate the abdomen.   The exam includes images of the liver, gallbladder, common bile duct, pancreas, spleen, kidneys, IV

## (undated) NOTE — Clinical Note
Dear Dr. Izzy Rios,       Thank you for referring Garcia Truong to the [facility].   Sincerely,  QUAN Weaver

## (undated) NOTE — ED AVS SNAPSHOT
[facility] in [address] [facility]    Phone:  [phone]    Fax:  [address]   MRN: [id number]    Department:  [facility] in [place]   Date of Visit: IF THERE IS ANY CHANGE OR WORSENING OF YOUR CONDITION, CALL YOUR PRIMARY CARE PHYSICIAN AT ONCE OR RETURN IMMEDIATELY TO THE EMERGENCY DEPARTMENT.     If you have been prescribed any medication(s), please fill your prescription right away and begin taking t

## (undated) NOTE — Clinical Note
FYI, TCM call made, see notes. 1/17/18 Patient discharge home DX: Atypical chest pain, Echo without interval change. Nuclear stress test negative. Patient back in ER on 1/18/18 with diarrhea thought to be C-diff. Message sent to MD's office.

## (undated) NOTE — IP AVS SNAPSHOT
[address]            (For Outpatient Use Only) Initial Admit Date: 10/19/2018   Inpt/Obs Admit Date: Inpt: N/A / Obs: 10/20/18   Discharge Date:    Hospital Acct:  [de-identified]   MRN: [de-identified]   CSN: [id number]        [facility] Subscriber ID:  Pt Rel to Subscriber:    Hospital Account Financial Class: Medicare    October 20, 2018

## (undated) NOTE — Clinical Note
I saw [place] Levels in the [employer] in [facility] today for nasal irritation,  he was treated with comfort measures. [place] Levels will follow up with you if no better or as needed.  Thank you for the opportunity to care for DIOR Sofia

## (undated) NOTE — ED AVS SNAPSHOT
[facility] in [address] [facility]    Phone:  [phone]    Fax:  [address]   MRN: [id number]    Department:  [facility] in [place]   Date of Visit: [address] [place] [phone]  Pediatric [phone] Emergency Department   [phone]       To Check ER Wait Times:  TEXT 'ERwait' to 49084      Click [email or web address]      Or call [phone]    If you have any will be contacted. Please make sure we have your correct phone number before you leave. After you leave, you should follow the attached instructions. I have read and understand the instructions given to me by my caregivers.         [facility]

## (undated) NOTE — LETTER
October 16, 2017        [address] [place] [address]      Dear Paramjit Robb:    I am the Nurse Care Manager with [facility]. Just reaching out to see how you are doing since your discharge home.    When you have

## (undated) NOTE — LETTER
January 18, 2018        [address] [place] [phone]      Dear Joey Levels:    I am the Nurse Care Manager with Dr. Hopper Spearing office. Just reaching out to see how you are doing since your discharge home.    When you have a

## (undated) NOTE — ED AVS SNAPSHOT
Ankit Fermin   MRN: [id number]    Department:  [facility] Emergency Department   Date of Visit:  1/19/2018           Disclosure     Insurance plans vary and the physician(s) referred by the ER may not be covered by your plan.  Please contact your tell this physician (or your personal doctor if your instructions are to return to your personal doctor) about any new or lasting problems. The primary care or specialist physician will see patients referred from the [facility] Emergency Department.  Moe Sorto

## (undated) NOTE — ED AVS SNAPSHOT
José Negron   MRN: [id number]    Department:  [facility] Emergency Department   Date of Visit:  10/17/2018           Disclosure     Insurance plans vary and the physician(s) referred by the ER may not be covered by your plan.  Please contact your tell this physician (or your personal doctor if your instructions are to return to your personal doctor) about any new or lasting problems. The primary care or specialist physician will see patients referred from the [facility] Emergency Department.  Wing Pryor

## (undated) NOTE — ED AVS SNAPSHOT
Janeth Mukherjee   MRN: [id number]    Department:  [facility] Emergency Department   Date of Visit:  1/2/2020           Disclosure     Insurance plans vary and the physician(s) referred by the ER may not be covered by your plan.  Please contact your i tell this physician (or your personal doctor if your instructions are to return to your personal doctor) about any new or lasting problems. The primary care or specialist physician will see patients referred from the [facility] Emergency Department.  Louise Tellez

## (undated) NOTE — MR AVS SNAPSHOT
[facility]  [address] [address] [phone]               Thank you for choosing us for your health care visit with Bri Germain PA-C. We are glad to serve you and happy to provide you with this summary of your visit. Allergies as of Apr 08, 2017     Augmentin, [Amoxicillin-Pot Clavulanate] Nausea and vomiting    Pt states not allergic to augmentin                Today's Vital Signs     BP Pulse Temp Height Weight BMI    130/78 mmHg 74 97.2 °F (36.2 °C) (Oral) 68\" 1

## (undated) NOTE — MR AVS SNAPSHOT
[address]  [address] [address] [phone]               Thank you for choosing us for your health care visit with Margarita Garcia DO.   We are glad to serve you and happy to provide you with this This list is accurate as of: 4/10/17 12:45 PM.  Always use your most recent med list.                aspirin 81 MG Tabs   Take 1 tablet by mouth daily. carvedilol 3.125 MG Tabs   Take 1 tablet (3.125 mg total) by mouth 2 (two) times daily.    Comm

## (undated) NOTE — ED AVS SNAPSHOT
[facility] Emergency Department in [address] [facility]    Phone:  [phone]    Fax:  [address]   MRN: [id number]    Department:  [facility] Emergency Department in [address] [place] [address]   Date of Visit: IF THERE IS ANY CHANGE OR WORSENING OF YOUR CONDITION, CALL YOUR PRIMARY CARE PHYSICIAN AT ONCE OR RETURN IMMEDIATELY TO THE EMERGENCY DEPARTMENT.     If you have been prescribed any medication(s), please fill your prescription right away and begin taking t

## (undated) NOTE — MR AVS SNAPSHOT
[address]  [address] [place] [id number] [phone]               Thank you for choosing us for your health care visit with Deonte Jones DO.   We are glad to serve you and happy to provide you with this finasteride 5 MG Tabs   TAKE 1 TABLET BY MOUTH EVERY DAY   Commonly known as:  PROSCAR           Ipratropium Bromide 0.03 % Soln   Commonly known as:  ATROVENT           Levocetirizine Dihydrochloride 5 MG Tabs   Take 1 tablet (5 mg total) by mouth

## (undated) NOTE — MR AVS SNAPSHOT
[facility] [place]  [address] [facility] [address] [address] [phone]               Thank you for choosing us for your health care visit with DIOR Mack. We are glad to serve you and happy to provide you with this summary of your visit.   [place] CENTRUM SILVER OR   Take  by mouth.            Donepezil HCl 10 MG Tabs   Commonly known as:  ARICEPT           finasteride 5 MG Tabs   TAKE 1 TABLET BY MOUTH EVERY DAY   Commonly known as:  PROSCAR           Levocetirizine Dihydrochloride 5 MG Tabs   Take